# Patient Record
Sex: MALE | Race: WHITE | NOT HISPANIC OR LATINO | Employment: OTHER | URBAN - METROPOLITAN AREA
[De-identification: names, ages, dates, MRNs, and addresses within clinical notes are randomized per-mention and may not be internally consistent; named-entity substitution may affect disease eponyms.]

---

## 2020-06-10 ENCOUNTER — HOSPITAL ENCOUNTER (EMERGENCY)
Facility: HOSPITAL | Age: 70
Discharge: HOME | End: 2020-06-10
Attending: EMERGENCY MEDICINE
Payer: MEDICAID

## 2020-06-10 VITALS
OXYGEN SATURATION: 99 % | TEMPERATURE: 98 F | BODY MASS INDEX: 33.32 KG/M2 | DIASTOLIC BLOOD PRESSURE: 87 MMHG | RESPIRATION RATE: 18 BRPM | SYSTOLIC BLOOD PRESSURE: 141 MMHG | HEIGHT: 65 IN | WEIGHT: 200 LBS | HEART RATE: 94 BPM

## 2020-06-10 DIAGNOSIS — N30.01 ACUTE CYSTITIS WITH HEMATURIA: Primary | ICD-10-CM

## 2020-06-10 LAB
BACTERIA #/AREA URNS HPF: ABNORMAL /HPF
BILIRUB UR QL STRIP: NEGATIVE
CLARITY UR: ABNORMAL
COLOR UR: YELLOW
GLUCOSE UR QL STRIP: ABNORMAL
HGB UR QL STRIP: ABNORMAL
HYALINE CASTS #/AREA URNS LPF: 0 /LPF
KETONES UR QL STRIP: NEGATIVE
LEUKOCYTE ESTERASE UR QL STRIP: ABNORMAL
MICROSCOPIC COMMENT: ABNORMAL
NITRITE UR QL STRIP: POSITIVE
PH UR STRIP: 6 [PH] (ref 5–8)
PROT UR QL STRIP: ABNORMAL
RBC #/AREA URNS HPF: >100 /HPF (ref 0–4)
SP GR UR STRIP: 1.01 (ref 1–1.03)
URN SPEC COLLECT METH UR: ABNORMAL
UROBILINOGEN UR STRIP-ACNC: 1 EU/DL
WBC #/AREA URNS HPF: >100 /HPF (ref 0–5)
YEAST URNS QL MICRO: ABNORMAL

## 2020-06-10 PROCEDURE — 81000 URINALYSIS NONAUTO W/SCOPE: CPT

## 2020-06-10 PROCEDURE — 87186 SC STD MICRODIL/AGAR DIL: CPT

## 2020-06-10 PROCEDURE — 99283 EMERGENCY DEPT VISIT LOW MDM: CPT

## 2020-06-10 PROCEDURE — 87088 URINE BACTERIA CULTURE: CPT

## 2020-06-10 PROCEDURE — 87077 CULTURE AEROBIC IDENTIFY: CPT

## 2020-06-10 PROCEDURE — 25000003 PHARM REV CODE 250: Performed by: EMERGENCY MEDICINE

## 2020-06-10 PROCEDURE — 87086 URINE CULTURE/COLONY COUNT: CPT

## 2020-06-10 RX ORDER — NITROFURANTOIN 25; 75 MG/1; MG/1
100 CAPSULE ORAL 2 TIMES DAILY
Qty: 9 CAPSULE | Refills: 0 | Status: SHIPPED | OUTPATIENT
Start: 2020-06-10 | End: 2020-06-15

## 2020-06-10 RX ORDER — NITROFURANTOIN 25; 75 MG/1; MG/1
100 CAPSULE ORAL
Status: COMPLETED | OUTPATIENT
Start: 2020-06-10 | End: 2020-06-10

## 2020-06-10 RX ADMIN — NITROFURANTOIN MONOHYDRATE/MACROCRYSTALLINE 100 MG: 25; 75 CAPSULE ORAL at 02:06

## 2020-06-10 NOTE — DISCHARGE INSTRUCTIONS
As we discussed, return to the emergency department for new or worsening symptoms including fever, vomiting, or persistent abdominal pain.

## 2020-06-10 NOTE — ED NOTES
Pt presents to the ED with complaints of burning with urination as well as incontinence which began x 3 days ago. Denies abdominal pain. Bed rails are up and call light is within patient reach. Will continue to monitor.
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
## 2020-06-12 LAB — BACTERIA UR CULT: ABNORMAL

## 2020-06-13 ENCOUNTER — TELEPHONE (OUTPATIENT)
Dept: EMERGENCY MEDICINE | Facility: HOSPITAL | Age: 70
End: 2020-06-13

## 2020-06-14 NOTE — ED NOTES
Patient called back and called in new Rx for BactrimDS, disp: 14, si po bid.  Is to stop Macrobid. Patient spoke with Dr VERONICA Moreno

## 2020-06-14 NOTE — TELEPHONE ENCOUNTER
Called and spoke to patient regarding his urine culture growing out Staph.  Patient currently on Macrobid but switched to Bactrim which was called into [facility] on [address].  Patient understands and will  antibiotics to treat his urinary tract infection.

## 2021-11-24 ENCOUNTER — SPECIALTY PHARMACY (OUTPATIENT)
Dept: PHARMACY | Facility: CLINIC | Age: 71
End: 2021-11-24
Payer: MEDICAID

## 2021-12-01 ENCOUNTER — TELEPHONE (OUTPATIENT)
Dept: PHARMACY | Facility: CLINIC | Age: 71
End: 2021-12-01
Payer: MEDICAID

## 2022-01-28 ENCOUNTER — SPECIALTY PHARMACY (OUTPATIENT)
Dept: PHARMACY | Facility: CLINIC | Age: 72
End: 2022-01-28
Payer: MEDICAID
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
## 2022-03-12 ENCOUNTER — SPECIALTY PHARMACY (OUTPATIENT)
Dept: PHARMACY | Facility: CLINIC | Age: 72
End: 2022-03-12
Payer: MEDICAID

## 2022-03-12 NOTE — TELEPHONE ENCOUNTER
Specialty Pharmacy - Refill Coordination    Specialty Medication Orders Linked to Encounter    Flowsheet Row Most Recent Value   Medication #1 apremilast (OTEZLA) 30 mg Tab (Order#[id number], Rx#[id number])          Refill Questions - Documented Responses    Flowsheet Row Most Recent Value   Patient Availability and HIPAA Verification    Does patient want to proceed with activity? Yes   HIPAA/medical authority confirmed? Yes   Relationship to patient of person spoken to? Self   Refill Screening Questions    Changes to allergies? No   Changes to medications? No   New conditions since last clinic visit? No   Unplanned office visit, urgent care, ED, or hospital admission in the last 4 weeks? No   How does patient/caregiver feel medication is working? Excellent   Financial problems or insurance changes? No   How many doses of your specialty medications were missed in the last 4 weeks? 0   Would patient like to speak to a pharmacist? No   When does the patient need to receive the medication? 03/18/22   Refill Delivery Questions    How will the patient receive the medication? Delivery Lo   When does the patient need to receive the medication? 03/18/22   Shipping Address Home   Address in [facility] confirmed and updated if neccessary? Yes   Expected Copay ($) 9.85   Is the patient able to afford the medication copay? Yes   Payment Method CC on file   Days supply of Refill 30   Supplies needed? No supplies needed   Refill activity completed? Yes   Refill activity plan Refill scheduled   Shipment/Pickup Date: 03/16/22          Current Outpatient Medications   Medication Sig    ALCOHOL PADS PadM TEST 2 TIMES DAILY    apremilast (OTEZLA) 30 mg Tab Take 1 tablet by mouth twice a day    atorvastatin (LIPITOR) 10 MG tablet     augmented betamethasone dipropionate (DIPROLENE-AF) 0.05 % cream APPLY TO THE AFFECTED AREA TWICE FOR 2-3 WEEKS THEN TAKE 1 WEEK OFF    BYDUREON BCISE 2 mg/0.85 mL AtIn     citalopram  "(CELEXA) 20 MG tablet     COMFORT EZ INSULIN SYRINGE 1 mL 31 gauge x 5/16 Syrg INJECT SUBCUTANEOUSLY 2 TIMES DAILY    COMFORT EZ PEN NEEDLES 33 gauge x 5/32" Ndle INJECT SUBCUTANEOUSLY 2 TIMES DAILY    fluocinonide (LIDEX) 0.05 % external solution APPLY TO SCALP 1 2 TIMES A DAY    icosapent ethyL (VASCEPA) 1 gram Cap Take 2 capsules by mouth 2 (two) times daily.    JARDIANCE 25 mg tablet Take 25 mg by mouth once daily.    LANTUS SOLOSTAR U-100 INSULIN glargine 100 units/mL (3mL) SubQ pen INJECT 35 UNITS BELOW SKIN NIGHTLY AT BEDTIME    levothyroxine (SYNTHROID) 88 MCG tablet     losartan (COZAAR) 50 MG tablet Take 50 mg by mouth once daily.    multivitamin capsule Take 1 capsule by mouth once daily.    vit C-vit E-lutein-min-om-3 970-39-3-150 mg-unit-mg-mg Cap Take by mouth once daily.   Last reviewed on 1/28/2022  7:34 AM by Gabriele Edmondson, PharmD    Review of patient's allergies indicates:   Allergen Reactions    Vicodin [hydrocodone-acetaminophen] Nausea Only and Other (See Comments)     Turned pale, Lightheaded    Last reviewed on  1/28/2022 7:34 AM by Gabriele Edmondson      Tasks added this encounter   4/10/2022 - Refill Call (Auto Added)   Tasks due within next 3 months   No tasks due.     Irving Caceres, PharmD  [facility]  [address] [place] [id number]  Phone: [phone]  Fax: [phone]      "

## 2022-04-13 ENCOUNTER — SPECIALTY PHARMACY (OUTPATIENT)
Dept: PHARMACY | Facility: CLINIC | Age: 72
End: 2022-04-13
Payer: MEDICAID

## 2022-04-13 NOTE — TELEPHONE ENCOUNTER
Specialty Pharmacy - Refill Coordination    Specialty Medication Orders Linked to Encounter    Flowsheet Row Most Recent Value   Medication #1 apremilast (OTEZLA) 30 mg Tab (Order#[id number], Rx#[id number])          Refill Questions - Documented Responses    Flowsheet Row Most Recent Value   Patient Availability and HIPAA Verification    Does patient want to proceed with activity? Yes   HIPAA/medical authority confirmed? Yes   Relationship to patient of person spoken to? Self   Refill Screening Questions    Changes to allergies? No   Changes to medications? No   New conditions since last clinic visit? No   Unplanned office visit, urgent care, ED, or hospital admission in the last 4 weeks? No   How does patient/caregiver feel medication is working? Excellent   Financial problems or insurance changes? No   How many doses of your specialty medications were missed in the last 4 weeks? 0   Would patient like to speak to a pharmacist? No   When does the patient need to receive the medication? 04/15/22   Refill Delivery Questions    How will the patient receive the medication? Delivery Lo   When does the patient need to receive the medication? 04/15/22   Shipping Address Home   Address in [facility] confirmed and updated if neccessary? Yes   Expected Copay ($) 0   Is the patient able to afford the medication copay? Yes   Payment Method zero copay   Days supply of Refill 30   Supplies needed? No supplies needed   Refill activity completed? Yes   Refill activity plan Refill scheduled   Shipment/Pickup Date: 04/14/22          Current Outpatient Medications   Medication Sig    ALCOHOL PADS PadM TEST 2 TIMES DAILY    apremilast (OTEZLA) 30 mg Tab Take 1 tablet by mouth twice a day    atorvastatin (LIPITOR) 10 MG tablet     augmented betamethasone dipropionate (DIPROLENE-AF) 0.05 % cream APPLY TO THE AFFECTED AREA TWICE FOR 2-3 WEEKS THEN TAKE 1 WEEK OFF    BYDUREON BCISE 2 mg/0.85 mL AtIn     citalopram (CELEXA)  "20 MG tablet     COMFORT EZ INSULIN SYRINGE 1 mL 31 gauge x 5/16 Syrg INJECT SUBCUTANEOUSLY 2 TIMES DAILY    COMFORT EZ PEN NEEDLES 33 gauge x 5/32" Ndle INJECT SUBCUTANEOUSLY 2 TIMES DAILY    fluocinonide (LIDEX) 0.05 % external solution APPLY TO SCALP 1 2 TIMES A DAY    icosapent ethyL (VASCEPA) 1 gram Cap Take 2 capsules by mouth 2 (two) times daily.    JARDIANCE 25 mg tablet Take 25 mg by mouth once daily.    LANTUS SOLOSTAR U-100 INSULIN glargine 100 units/mL (3mL) SubQ pen INJECT 35 UNITS BELOW SKIN NIGHTLY AT BEDTIME    levothyroxine (SYNTHROID) 88 MCG tablet     losartan (COZAAR) 50 MG tablet Take 50 mg by mouth once daily.    multivitamin capsule Take 1 capsule by mouth once daily.    vit C-vit E-lutein-min-om-3 864-80-4-150 mg-unit-mg-mg Cap Take by mouth once daily.   Last reviewed on 1/28/2022  7:34 AM by Gabriele Edmondson PharmD    Review of patient's allergies indicates:   Allergen Reactions    Vicodin [hydrocodone-acetaminophen] Nausea Only and Other (See Comments)     Turned pale, Lightheaded    Last reviewed on  1/28/2022 7:34 AM by Gabriele Edmondson      Tasks added this encounter   No tasks added.   Tasks due within next 3 months   4/10/2022 - Refill Call (Auto Added)     [facility]  [address] [place] [id number]  Phone: [phone]  Fax: [phone]      "

## 2022-05-11 ENCOUNTER — SPECIALTY PHARMACY (OUTPATIENT)
Dept: PHARMACY | Facility: CLINIC | Age: 72
End: 2022-05-11
Payer: MEDICAID

## 2022-05-11 NOTE — TELEPHONE ENCOUNTER
Specialty Pharmacy - Refill Coordination    Specialty Medication Orders Linked to Encounter    Flowsheet Row Most Recent Value   Medication #1 apremilast (OTEZLA) 30 mg Tab (Order#[id number], Rx#[id number])          Refill Questions - Documented Responses    Flowsheet Row Most Recent Value   Patient Availability and HIPAA Verification    Does patient want to proceed with activity? Yes   HIPAA/medical authority confirmed? Yes   Relationship to patient of person spoken to? Self   Refill Screening Questions    Changes to allergies? No   Changes to medications? No   New conditions since last clinic visit? No   Unplanned office visit, urgent care, ED, or hospital admission in the last 4 weeks? No   How does patient/caregiver feel medication is working? Good   Financial problems or insurance changes? No   How many doses of your specialty medications were missed in the last 4 weeks? 0   Would patient like to speak to a pharmacist? No   When does the patient need to receive the medication? 05/14/22   Refill Delivery Questions    How will the patient receive the medication? Delivery Lo   When does the patient need to receive the medication? 05/14/22   Shipping Address Home   Address in [facility] confirmed and updated if neccessary? Yes   Expected Copay ($) 0   Is the patient able to afford the medication copay? Yes   Payment Method zero copay   Days supply of Refill 30   Supplies needed? No supplies needed   Refill activity completed? Yes   Refill activity plan Refill scheduled   Shipment/Pickup Date: 05/13/22          Current Outpatient Medications   Medication Sig    ALCOHOL PADS PadM TEST 2 TIMES DAILY    apremilast (OTEZLA) 30 mg Tab Take 1 tablet by mouth twice a day    atorvastatin (LIPITOR) 10 MG tablet     augmented betamethasone dipropionate (DIPROLENE-AF) 0.05 % cream APPLY TO THE AFFECTED AREA TWICE FOR 2-3 WEEKS THEN TAKE 1 WEEK OFF    BYDUREON BCISE 2 mg/0.85 mL AtIn     citalopram (CELEXA) 20 MG  "tablet     COMFORT EZ INSULIN SYRINGE 1 mL 31 gauge x 5/16 Syrg INJECT SUBCUTANEOUSLY 2 TIMES DAILY    COMFORT EZ PEN NEEDLES 33 gauge x 5/32" Ndle INJECT SUBCUTANEOUSLY 2 TIMES DAILY    fluocinonide (LIDEX) 0.05 % external solution APPLY TO SCALP 1 2 TIMES A DAY    icosapent ethyL (VASCEPA) 1 gram Cap Take 2 capsules by mouth 2 (two) times daily.    JARDIANCE 25 mg tablet Take 25 mg by mouth once daily.    LANTUS SOLOSTAR U-100 INSULIN glargine 100 units/mL (3mL) SubQ pen INJECT 35 UNITS BELOW SKIN NIGHTLY AT BEDTIME    levothyroxine (SYNTHROID) 88 MCG tablet     losartan (COZAAR) 50 MG tablet Take 50 mg by mouth once daily.    multivitamin capsule Take 1 capsule by mouth once daily.    vit C-vit E-lutein-min-om-3 [id number] mg-unit-mg-mg Cap Take by mouth once daily.   Last reviewed on 1/28/2022  7:34 AM by Gabriele Edmondson, PharmD    Review of patient's allergies indicates:   Allergen Reactions    Vicodin [hydrocodone-acetaminophen] Nausea Only and Other (See Comments)     Turned pale, Lightheaded    Last reviewed on  1/28/2022 7:34 AM by Gabriele Edmondson      Tasks added this encounter   6/6/2022 - Refill Call (Auto Added)   Tasks due within next 3 months   No tasks due.     Vianey Holder [facility] - Specialty Pharmacy  [address] [facility] [address]  Phone: [phone]  Fax: [phone]      "

## 2022-06-13 ENCOUNTER — SPECIALTY PHARMACY (OUTPATIENT)
Dept: PHARMACY | Facility: CLINIC | Age: 72
End: 2022-06-13
Payer: MEDICAID

## 2022-06-13 DIAGNOSIS — L40.9 PSORIASIS: Primary | ICD-10-CM

## 2022-06-13 NOTE — TELEPHONE ENCOUNTER
"Specialty Pharmacy - Refill Coordination    Specialty Medication Orders Linked to Encounter    Flowsheet Row Most Recent Value   Medication #1 apremilast (OTEZLA) 30 mg Tab (Order#[id number], Rx#[id number])          Refill Questions - Documented Responses    Flowsheet Row Most Recent Value   Patient Availability and HIPAA Verification    Does patient want to proceed with activity? Yes   HIPAA/medical authority confirmed? Yes   Relationship to patient of person spoken to? Self   Refill Screening Questions    Changes to allergies? No   Changes to medications? No   New conditions since last clinic visit? No   Unplanned office visit, urgent care, ED, or hospital admission in the last 4 weeks? No   How does patient/caregiver feel medication is working? Good   Financial problems or insurance changes? No   How many doses of your specialty medications were missed in the last 4 weeks? 0   Would patient like to speak to a pharmacist? No   When does the patient need to receive the medication? 06/15/22   Refill Delivery Questions    How will the patient receive the medication? Delivery Lo   When does the patient need to receive the medication? 06/15/22   Shipping Address Home   Address in [facility] confirmed and updated if neccessary? Yes   Expected Copay ($) 0   Is the patient able to afford the medication copay? Yes   Payment Method zero copay   Days supply of Refill 30   Supplies needed? No supplies needed   Refill activity completed? Yes   Refill activity plan Refill scheduled   Shipment/Pickup Date: 06/15/22          Current Outpatient Medications   Medication Sig    ALCOHOL PADS PadM TEST 2 TIMES DAILY    apremilast (OTEZLA) 30 mg Tab Take 1 tablet by mouth twice a day    atorvastatin (LIPITOR) 10 MG tablet     augmented betamethasone dipropionate (DIPROLENE-AF) 0.05 % cream APPLY TO THE AFFECTED AREA TWICE FOR 2-3 WEEKS THEN TAKE 1 WEEK OFF    BD ULTRA-FINE SHANI PEN NEEDLE 32 gauge x 5/32" Ndle Inject 1 " "each into the skin 3 (three) times daily.    BYDUREON BCISE 2 mg/0.85 mL AtIn     citalopram (CELEXA) 20 MG tablet Take 20 mg by mouth once daily.    COMFORT EZ INSULIN SYRINGE 1 mL 31 gauge x 5/16 Syrg INJECT SUBCUTANEOUSLY 2 TIMES DAILY    COMFORT EZ PEN NEEDLES 33 gauge x 5/32" Ndle INJECT SUBCUTANEOUSLY 2 TIMES DAILY    fluocinonide (LIDEX) 0.05 % external solution APPLY TO SCALP 1 2 TIMES A DAY    icosapent ethyL (VASCEPA) 1 gram Cap Take 2 capsules by mouth 2 (two) times daily.    JARDIANCE 25 mg tablet Take 25 mg by mouth once daily.    LANTUS SOLOSTAR U-100 INSULIN glargine 100 units/mL (3mL) SubQ pen INJECT 35 UNITS BELOW SKIN NIGHTLY AT BEDTIME    levothyroxine (SYNTHROID) 88 MCG tablet Take 88 mcg by mouth once daily.    losartan (COZAAR) 50 MG tablet Take 50 mg by mouth once daily.    multivitamin capsule Take 1 capsule by mouth once daily.    vit C-vit E-lutein-min-om-3 [id number] mg-unit-mg-mg Cap Take by mouth once daily.   Last reviewed on 5/18/2022 10:17 AM by Katie Carrillo LPN    Review of patient's allergies indicates:   Allergen Reactions    Vicodin [hydrocodone-acetaminophen] Nausea Only and Other (See Comments)     Turned pale, Lightheaded    Last reviewed on  1/28/2022 7:34 AM by Gabriele Edmondson      Tasks added this encounter   7/8/2022 - Refill Call (Auto Added)   Tasks due within next 3 months   No tasks due.     Jessica Renteria, PharmD  [facility] - Specialty Pharmacy  [address] [place] [address]  Phone: [phone]  Fax: [phone]      "

## 2022-07-12 ENCOUNTER — SPECIALTY PHARMACY (OUTPATIENT)
Dept: PHARMACY | Facility: CLINIC | Age: 72
End: 2022-07-12
Payer: MEDICAID

## 2022-07-12 NOTE — TELEPHONE ENCOUNTER
"Specialty Pharmacy - Refill Coordination    Specialty Medication Orders Linked to Encounter    Flowsheet Row Most Recent Value   Medication #1 apremilast (OTEZLA) 30 mg Tab (Order#[id number], Rx#[id number])          Refill Questions - Documented Responses    Flowsheet Row Most Recent Value   Patient Availability and HIPAA Verification    Does patient want to proceed with activity? Yes   HIPAA/medical authority confirmed? Yes   Relationship to patient of person spoken to? Self   Refill Screening Questions    Changes to allergies? No   Changes to medications? No   New conditions since last clinic visit? No   Unplanned office visit, urgent care, ED, or hospital admission in the last 4 weeks? No   How does patient/caregiver feel medication is working? Good   Financial problems or insurance changes? No   How many doses of your specialty medications were missed in the last 4 weeks? 0   Would patient like to speak to a pharmacist? No   When does the patient need to receive the medication? 07/14/22   Refill Delivery Questions    How will the patient receive the medication? Delivery Lo   When does the patient need to receive the medication? 07/14/22   Shipping Address Home   Address in [facility] confirmed and updated if neccessary? Yes   Expected Copay ($) 0   Is the patient able to afford the medication copay? Yes   Payment Method zero copay   Days supply of Refill 28   Supplies needed? No supplies needed   Refill activity completed? Yes   Refill activity plan Refill scheduled   Shipment/Pickup Date: 07/12/22          Current Outpatient Medications   Medication Sig    ALCOHOL PADS PadM TEST 2 TIMES DAILY    apremilast (OTEZLA) 30 mg Tab Take 1 tablet by mouth twice a day    atorvastatin (LIPITOR) 10 MG tablet     augmented betamethasone dipropionate (DIPROLENE-AF) 0.05 % cream APPLY TO THE AFFECTED AREA TWICE FOR 2-3 WEEKS THEN TAKE 1 WEEK OFF    BD ULTRA-FINE SHANI PEN NEEDLE 32 gauge x 5/32" Ndle Inject 1 " "each into the skin 3 (three) times daily.    BYDUREON BCISE 2 mg/0.85 mL AtIn     citalopram (CELEXA) 20 MG tablet Take 20 mg by mouth once daily.    COMFORT EZ INSULIN SYRINGE 1 mL 31 gauge x 5/16 Syrg INJECT SUBCUTANEOUSLY 2 TIMES DAILY    COMFORT EZ PEN NEEDLES 33 gauge x 5/32" Ndle INJECT SUBCUTANEOUSLY 2 TIMES DAILY    fluocinonide (LIDEX) 0.05 % external solution APPLY TO SCALP 1 2 TIMES A DAY    icosapent ethyL (VASCEPA) 1 gram Cap Take 2 capsules by mouth 2 (two) times daily.    JARDIANCE 25 mg tablet Take 25 mg by mouth once daily.    LANTUS SOLOSTAR U-100 INSULIN glargine 100 units/mL (3mL) SubQ pen INJECT 35 UNITS BELOW SKIN NIGHTLY AT BEDTIME    levothyroxine (SYNTHROID) 88 MCG tablet Take 88 mcg by mouth once daily.    losartan (COZAAR) 50 MG tablet Take 50 mg by mouth once daily.    multivitamin capsule Take 1 capsule by mouth once daily.    vit C-vit E-lutein-min-om-3 417-66-2-150 mg-unit-mg-mg Cap Take by mouth once daily.   Last reviewed on 5/18/2022 10:17 AM by Katie Carrillo LPN    Review of patient's allergies indicates:   Allergen Reactions    Vicodin [hydrocodone-acetaminophen] Nausea Only and Other (See Comments)     Turned pale, Lightheaded    Last reviewed on  1/28/2022 7:34 AM by Gabriele Edmondson      Tasks added this encounter   8/4/2022 - Refill Call (Auto Added)   Tasks due within next 3 months   No tasks due.     Dennis Contreras  [facility] - Specialty Pharmacy  [address] [place] [address]  Phone: [phone]  Fax: [phone]      "

## 2022-08-04 ENCOUNTER — SPECIALTY PHARMACY (OUTPATIENT)
Dept: PHARMACY | Facility: CLINIC | Age: 72
End: 2022-08-04
Payer: MEDICAID

## 2022-08-04 NOTE — TELEPHONE ENCOUNTER
"Specialty Pharmacy - Refill Coordination    Specialty Medication Orders Linked to Encounter    Flowsheet Row Most Recent Value   Medication #1 apremilast (OTEZLA) 30 mg Tab (Order#[id number], Rx#[id number])          Refill Questions - Documented Responses    Flowsheet Row Most Recent Value   Patient Availability and HIPAA Verification    Does patient want to proceed with activity? Yes   HIPAA/medical authority confirmed? Yes   Relationship to patient of person spoken to? Self   Refill Screening Questions    Changes to allergies? No   Changes to medications? No   New conditions since last clinic visit? No   Unplanned office visit, urgent care, ED, or hospital admission in the last 4 weeks? No   How does patient/caregiver feel medication is working? Good   Financial problems or insurance changes? No   How many doses of your specialty medications were missed in the last 4 weeks? 0   Would patient like to speak to a pharmacist? No   When does the patient need to receive the medication? 08/13/22   Refill Delivery Questions    When does the patient need to receive the medication? 08/13/22   Shipping Address Home   Address in [facility] confirmed and updated if neccessary? Yes   Expected Copay ($) 0   Is the patient able to afford the medication copay? Yes   Payment Method zero copay   Days supply of Refill 30   Supplies needed? No supplies needed   Refill activity completed? Yes   Refill activity plan Refill scheduled   Shipment/Pickup Date: 08/09/22          Current Outpatient Medications   Medication Sig    ALCOHOL PADS PadM TEST 2 TIMES DAILY    apremilast (OTEZLA) 30 mg Tab Take 1 tablet by mouth twice a day    atorvastatin (LIPITOR) 10 MG tablet     augmented betamethasone dipropionate (DIPROLENE-AF) 0.05 % cream APPLY TO THE AFFECTED AREA TWICE FOR 2-3 WEEKS THEN TAKE 1 WEEK OFF    BD ULTRA-FINE SHANI PEN NEEDLE 32 gauge x 5/32" Ndle Inject 1 each into the skin 3 (three) times daily.    BYDUREON BCISE 2 " "mg/0.85 mL AtIn     citalopram (CELEXA) 20 MG tablet Take 20 mg by mouth once daily.    COMFORT EZ INSULIN SYRINGE 1 mL 31 gauge x 5/16 Syrg INJECT SUBCUTANEOUSLY 2 TIMES DAILY    COMFORT EZ PEN NEEDLES 33 gauge x 5/32" Ndle INJECT SUBCUTANEOUSLY 2 TIMES DAILY    fluocinonide (LIDEX) 0.05 % external solution APPLY TO SCALP 1 2 TIMES A DAY    icosapent ethyL (VASCEPA) 1 gram Cap Take 2 capsules by mouth 2 (two) times daily.    JARDIANCE 25 mg tablet Take 25 mg by mouth once daily.    LANTUS SOLOSTAR U-100 INSULIN glargine 100 units/mL (3mL) SubQ pen INJECT 35 UNITS BELOW SKIN NIGHTLY AT BEDTIME    levothyroxine (SYNTHROID) 88 MCG tablet Take 88 mcg by mouth once daily.    losartan (COZAAR) 50 MG tablet Take 50 mg by mouth once daily.    multivitamin capsule Take 1 capsule by mouth once daily.    vit C-vit E-lutein-min-om-3 346-10-4-150 mg-unit-mg-mg Cap Take by mouth once daily.   Last reviewed on 5/18/2022 10:17 AM by Katie Carrillo LPN    Review of patient's allergies indicates:   Allergen Reactions    Vicodin [hydrocodone-acetaminophen] Nausea Only and Other (See Comments)     Turned pale, Lightheaded    Last reviewed on  1/28/2022 7:34 AM by Gabriele Edmondson      Tasks added this encounter   No tasks added.   Tasks due within next 3 months   8/4/2022 - Refill Call (Auto Added)     Дмитрий Camacho, PharmD  [facility] - Specialty Pharm[facility]  [address] [place] [id number]  Phone: [phone]  Fax: [phone]      "

## 2022-09-06 ENCOUNTER — SPECIALTY PHARMACY (OUTPATIENT)
Dept: PHARMACY | Facility: CLINIC | Age: 72
End: 2022-09-06
Payer: MEDICAID

## 2022-09-06 NOTE — TELEPHONE ENCOUNTER
"Specialty Pharmacy - Refill Coordination    Specialty Medication Orders Linked to Encounter      Flowsheet Row Most Recent Value   Medication #1 apremilast (OTEZLA) 30 mg Tab (Order#[id number], Rx#[id number])          Refill Questions - Documented Responses      Flowsheet Row Most Recent Value   Patient Availability and HIPAA Verification    Does patient want to proceed with activity? Yes   HIPAA/medical authority confirmed? Yes   Relationship to patient of person spoken to? Self   Refill Screening Questions    Changes to allergies? No   Changes to medications? No   New conditions since last clinic visit? No   Unplanned office visit, urgent care, ED, or hospital admission in the last 4 weeks? No   How does patient/caregiver feel medication is working? Good   Financial problems or insurance changes? No   How many doses of your specialty medications were missed in the last 4 weeks? 0   Would patient like to speak to a pharmacist? No   When does the patient need to receive the medication? 09/13/22   Refill Delivery Questions    How will the patient receive the medication? Delivery Lo   When does the patient need to receive the medication? 09/13/22   Shipping Address Home   Address in [facility] confirmed and updated if neccessary? Yes   Expected Copay ($) 0   Is the patient able to afford the medication copay? Yes   Payment Method zero copay   Days supply of Refill 30   Supplies needed? No supplies needed   Refill activity completed? Yes   Refill activity plan Refill scheduled   Shipment/Pickup Date: 09/08/22            Current Outpatient Medications   Medication Sig    ALCOHOL PADS PadM TEST 2 TIMES DAILY    apremilast (OTEZLA) 30 mg Tab Take 1 tablet by mouth twice a day    atorvastatin (LIPITOR) 10 MG tablet     augmented betamethasone dipropionate (DIPROLENE-AF) 0.05 % cream APPLY TO THE AFFECTED AREA TWICE FOR 2-3 WEEKS THEN TAKE 1 WEEK OFF    BD ULTRA-FINE SHANI PEN NEEDLE 32 gauge x 5/32" Ndle Inject 1 " "each into the skin 3 (three) times daily.    BYDUREON BCISE 2 mg/0.85 mL AtIn     citalopram (CELEXA) 20 MG tablet Take 20 mg by mouth once daily.    COMFORT EZ INSULIN SYRINGE 1 mL 31 gauge x 5/16 Syrg INJECT SUBCUTANEOUSLY 2 TIMES DAILY    COMFORT EZ PEN NEEDLES 33 gauge x 5/32" Ndle INJECT SUBCUTANEOUSLY 2 TIMES DAILY    fluocinonide (LIDEX) 0.05 % external solution APPLY TO SCALP 1 2 TIMES A DAY    icosapent ethyL (VASCEPA) 1 gram Cap Take 2 capsules by mouth 2 (two) times daily.    JARDIANCE 25 mg tablet Take 25 mg by mouth once daily.    LANTUS SOLOSTAR U-100 INSULIN glargine 100 units/mL (3mL) SubQ pen INJECT 35 UNITS BELOW SKIN NIGHTLY AT BEDTIME    levothyroxine (SYNTHROID) 88 MCG tablet Take 88 mcg by mouth once daily.    losartan (COZAAR) 50 MG tablet Take 50 mg by mouth once daily.    multivitamin capsule Take 1 capsule by mouth once daily.    vit C-vit E-lutein-min-om-3 759-22-2-150 mg-unit-mg-mg Cap Take by mouth once daily.   Last reviewed on 5/18/2022 10:17 AM by Katie Carrillo LPN    Review of patient's allergies indicates:   Allergen Reactions    Vicodin [hydrocodone-acetaminophen] Nausea Only and Other (See Comments)     Turned pale, Lightheaded    Last reviewed on  1/28/2022 7:34 AM by Gabriele Edmondson      Tasks added this encounter   10/6/2022 - Refill Call (Auto Added)   Tasks due within next 3 months   No tasks due.     Fortunato Calhoun, PharmD  [facility] - Specialty Pharmacy  [address] [facility] [address]  Phone: [phone]  Fax: [phone]        "

## 2022-10-03 ENCOUNTER — HOSPITAL ENCOUNTER (EMERGENCY)
Facility: HOSPITAL | Age: 72
Discharge: HOME | End: 2022-10-03
Attending: EMERGENCY MEDICINE
Payer: MEDICAID

## 2022-10-03 VITALS
HEART RATE: 62 BPM | HEIGHT: 65 IN | BODY MASS INDEX: 33.32 KG/M2 | WEIGHT: 200 LBS | DIASTOLIC BLOOD PRESSURE: 65 MMHG | TEMPERATURE: 98 F | SYSTOLIC BLOOD PRESSURE: 136 MMHG | RESPIRATION RATE: 20 BRPM | OXYGEN SATURATION: 95 %

## 2022-10-03 DIAGNOSIS — S39.012A STRAIN OF LUMBAR REGION, INITIAL ENCOUNTER: Primary | ICD-10-CM

## 2022-10-03 LAB
ALBUMIN SERPL BCP-MCNC: 4.2 G/DL (ref 3.5–5.2)
ALP SERPL-CCNC: 244 U/L (ref 55–135)
ALT SERPL W/O P-5'-P-CCNC: 35 U/L (ref 10–44)
ANION GAP SERPL CALC-SCNC: 10 MMOL/L (ref 8–16)
AST SERPL-CCNC: 39 U/L (ref 10–40)
BACTERIA #/AREA URNS HPF: NORMAL /HPF
BASOPHILS # BLD AUTO: 0.05 K/UL (ref 0–0.2)
BASOPHILS NFR BLD: 0.7 % (ref 0–1.9)
BILIRUB SERPL-MCNC: 0.9 MG/DL (ref 0.1–1)
BILIRUB UR QL STRIP: NEGATIVE
BUN SERPL-MCNC: 9 MG/DL (ref 8–23)
CALCIUM SERPL-MCNC: 10.2 MG/DL (ref 8.7–10.5)
CHLORIDE SERPL-SCNC: 104 MMOL/L (ref 95–110)
CLARITY UR: CLEAR
CO2 SERPL-SCNC: 27 MMOL/L (ref 23–29)
COLOR UR: YELLOW
CREAT SERPL-MCNC: 1.3 MG/DL (ref 0.5–1.4)
DIFFERENTIAL METHOD: ABNORMAL
EOSINOPHIL # BLD AUTO: 0.3 K/UL (ref 0–0.5)
EOSINOPHIL NFR BLD: 3.5 % (ref 0–8)
ERYTHROCYTE [DISTWIDTH] IN BLOOD BY AUTOMATED COUNT: 14.6 % (ref 11.5–14.5)
EST. GFR  (NO RACE VARIABLE): 59 ML/MIN/1.73 M^2
GLUCOSE SERPL-MCNC: 122 MG/DL (ref 70–110)
GLUCOSE UR QL STRIP: ABNORMAL
HCT VFR BLD AUTO: 43.2 % (ref 40–54)
HGB BLD-MCNC: 14.4 G/DL (ref 14–18)
HGB UR QL STRIP: NEGATIVE
IMM GRANULOCYTES # BLD AUTO: 0.02 K/UL (ref 0–0.04)
IMM GRANULOCYTES NFR BLD AUTO: 0.3 % (ref 0–0.5)
KETONES UR QL STRIP: NEGATIVE
LEUKOCYTE ESTERASE UR QL STRIP: NEGATIVE
LYMPHOCYTES # BLD AUTO: 1.9 K/UL (ref 1–4.8)
LYMPHOCYTES NFR BLD: 26.5 % (ref 18–48)
MCH RBC QN AUTO: 28 PG (ref 27–31)
MCHC RBC AUTO-ENTMCNC: 33.3 G/DL (ref 32–36)
MCV RBC AUTO: 84 FL (ref 82–98)
MICROSCOPIC COMMENT: NORMAL
MONOCYTES # BLD AUTO: 0.7 K/UL (ref 0.3–1)
MONOCYTES NFR BLD: 9.8 % (ref 4–15)
NEUTROPHILS # BLD AUTO: 4.2 K/UL (ref 1.8–7.7)
NEUTROPHILS NFR BLD: 59.2 % (ref 38–73)
NITRITE UR QL STRIP: NEGATIVE
NRBC BLD-RTO: 0 /100 WBC
PH UR STRIP: 6 [PH] (ref 5–8)
PLATELET # BLD AUTO: 254 K/UL (ref 150–450)
PMV BLD AUTO: 10.8 FL (ref 9.2–12.9)
POTASSIUM SERPL-SCNC: 3.4 MMOL/L (ref 3.5–5.1)
PROT SERPL-MCNC: 8.3 G/DL (ref 6–8.4)
PROT UR QL STRIP: NEGATIVE
RBC # BLD AUTO: 5.14 M/UL (ref 4.6–6.2)
SODIUM SERPL-SCNC: 141 MMOL/L (ref 136–145)
SP GR UR STRIP: <=1.005 (ref 1–1.03)
URN SPEC COLLECT METH UR: ABNORMAL
UROBILINOGEN UR STRIP-ACNC: NEGATIVE EU/DL
WBC # BLD AUTO: 7.16 K/UL (ref 3.9–12.7)
YEAST URNS QL MICRO: NORMAL

## 2022-10-03 PROCEDURE — 63600175 PHARM REV CODE 636 W HCPCS: Performed by: EMERGENCY MEDICINE

## 2022-10-03 PROCEDURE — 99284 EMERGENCY DEPT VISIT MOD MDM: CPT

## 2022-10-03 PROCEDURE — 81000 URINALYSIS NONAUTO W/SCOPE: CPT | Performed by: EMERGENCY MEDICINE

## 2022-10-03 PROCEDURE — 80053 COMPREHEN METABOLIC PANEL: CPT | Performed by: EMERGENCY MEDICINE

## 2022-10-03 PROCEDURE — 96372 THER/PROPH/DIAG INJ SC/IM: CPT | Performed by: EMERGENCY MEDICINE

## 2022-10-03 PROCEDURE — 36415 COLL VENOUS BLD VENIPUNCTURE: CPT | Performed by: EMERGENCY MEDICINE

## 2022-10-03 PROCEDURE — 85025 COMPLETE CBC W/AUTO DIFF WBC: CPT | Performed by: EMERGENCY MEDICINE

## 2022-10-03 PROCEDURE — 25000003 PHARM REV CODE 250: Performed by: EMERGENCY MEDICINE

## 2022-10-03 RX ORDER — KETOROLAC TROMETHAMINE 30 MG/ML
30 INJECTION, SOLUTION INTRAMUSCULAR; INTRAVENOUS
Status: COMPLETED | OUTPATIENT
Start: 2022-10-03 | End: 2022-10-03

## 2022-10-03 RX ORDER — CYCLOBENZAPRINE HCL 10 MG
10 TABLET ORAL 3 TIMES DAILY PRN
Qty: 15 TABLET | Refills: 0 | Status: SHIPPED | OUTPATIENT
Start: 2022-10-03 | End: 2022-10-08

## 2022-10-03 RX ORDER — CYCLOBENZAPRINE HCL 10 MG
10 TABLET ORAL
Status: COMPLETED | OUTPATIENT
Start: 2022-10-03 | End: 2022-10-03

## 2022-10-03 RX ORDER — OXYCODONE AND ACETAMINOPHEN 5; 325 MG/1; MG/1
1 TABLET ORAL EVERY 4 HOURS PRN
Qty: 12 TABLET | Refills: 0 | Status: ON HOLD | OUTPATIENT
Start: 2022-10-03 | End: 2023-09-14 | Stop reason: HOSPADM

## 2022-10-03 RX ADMIN — CYCLOBENZAPRINE 10 MG: 10 TABLET, FILM COATED ORAL at 08:10

## 2022-10-03 RX ADMIN — KETOROLAC TROMETHAMINE 30 MG: 30 INJECTION, SOLUTION INTRAMUSCULAR; INTRAVENOUS at 08:10
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
## 2022-10-06 ENCOUNTER — SPECIALTY PHARMACY (OUTPATIENT)
Dept: PHARMACY | Facility: CLINIC | Age: 72
End: 2022-10-06
Payer: MEDICAID

## 2022-10-06 NOTE — TELEPHONE ENCOUNTER
"Specialty Pharmacy - Refill Coordination    Specialty Medication Orders Linked to Encounter      Flowsheet Row Most Recent Value   Medication #1 apremilast (OTEZLA) 30 mg Tab (Order#[id number], Rx#[id number])            Refill Questions - Documented Responses      Flowsheet Row Most Recent Value   Patient Availability and HIPAA Verification    Does patient want to proceed with activity? Yes   HIPAA/medical authority confirmed? Yes   Relationship to patient of person spoken to? Self   Refill Screening Questions    Changes to allergies? No   Changes to medications? No   New conditions since last clinic visit? No   Unplanned office visit, urgent care, ED, or hospital admission in the last 4 weeks? No   How does patient/caregiver feel medication is working? Very good   Financial problems or insurance changes? No   How many doses of your specialty medications were missed in the last 4 weeks? 0   Would patient like to speak to a pharmacist? No   When does the patient need to receive the medication? 10/13/22   Refill Delivery Questions    How will the patient receive the medication? MEDRx   When does the patient need to receive the medication? 10/13/22   Shipping Address Home   Address in [facility] confirmed and updated if neccessary? Yes   Expected Copay ($) 0   Is the patient able to afford the medication copay? Yes   Payment Method zero copay   Days supply of Refill 30   Supplies needed? No supplies needed   Refill activity completed? Yes   Refill activity plan Refill scheduled   Shipment/Pickup Date: 10/10/22            Current Outpatient Medications   Medication Sig    ALCOHOL PADS PadM TEST 2 TIMES DAILY    apremilast (OTEZLA) 30 mg Tab Take 1 tablet by mouth twice a day    atorvastatin (LIPITOR) 10 MG tablet     augmented betamethasone dipropionate (DIPROLENE-AF) 0.05 % cream APPLY TO THE AFFECTED AREA TWICE FOR 2-3 WEEKS THEN TAKE 1 WEEK OFF    BD ULTRA-FINE SHANI PEN NEEDLE 32 gauge x 5/32" Ndle Inject 1 " "each into the skin 3 (three) times daily.    BYDUREON BCISE 2 mg/0.85 mL AtIn     citalopram (CELEXA) 20 MG tablet Take 20 mg by mouth once daily.    COMFORT EZ INSULIN SYRINGE 1 mL 31 gauge x 5/16 Syrg INJECT SUBCUTANEOUSLY 2 TIMES DAILY    COMFORT EZ PEN NEEDLES 33 gauge x 5/32" Ndle INJECT SUBCUTANEOUSLY 2 TIMES DAILY    cyclobenzaprine (FLEXERIL) 10 MG tablet Take 1 tablet (10 mg total) by mouth 3 (three) times daily as needed for Muscle spasms.    fluocinonide (LIDEX) 0.05 % external solution APPLY TO SCALP 1 2 TIMES A DAY    icosapent ethyL (VASCEPA) 1 gram Cap Take 2 capsules by mouth 2 (two) times daily.    JARDIANCE 25 mg tablet Take 25 mg by mouth once daily.    LANTUS SOLOSTAR U-100 INSULIN glargine 100 units/mL (3mL) SubQ pen INJECT 35 UNITS BELOW SKIN NIGHTLY AT BEDTIME    levothyroxine (SYNTHROID) 88 MCG tablet Take 88 mcg by mouth once daily.    losartan (COZAAR) 50 MG tablet Take 50 mg by mouth once daily.    multivitamin capsule Take 1 capsule by mouth once daily.    oxyCODONE-acetaminophen (PERCOCET) 5-325 mg per tablet Take 1 tablet by mouth every 4 (four) hours as needed for Pain.    vit C-vit E-lutein-min-om-3 018-39-8-150 mg-unit-mg-mg Cap Take by mouth once daily.   Last reviewed on 10/3/2022  7:50 AM by Kaiden Richards III, MD    Review of patient's allergies indicates:   Allergen Reactions    Vicodin [hydrocodone-acetaminophen] Nausea Only and Other (See Comments)     Turned pale, Lightheaded    Last reviewed on  10/3/2022 7:50 AM by Kaiden Richards      Tasks added this encounter   No tasks added.   Tasks due within next 3 months   10/6/2022 - Refill Call (Auto Added)     Jose SuazoD  [facility]  [address] [place] [address]  Phone: [phone]  Fax: [phone]        "

## 2022-11-07 ENCOUNTER — SPECIALTY PHARMACY (OUTPATIENT)
Dept: PHARMACY | Facility: CLINIC | Age: 72
End: 2022-11-07
Payer: MEDICARE
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
## 2022-12-13 ENCOUNTER — SPECIALTY PHARMACY (OUTPATIENT)
Dept: PHARMACY | Facility: CLINIC | Age: 72
End: 2022-12-13
Payer: MEDICARE

## 2022-12-13 NOTE — TELEPHONE ENCOUNTER
Outgoing call: patient has 4 day supply on hand. I informed him that a refill request was sent on 12/8 and we still haven't heard back yet. He stated he will follow up on his end. [facility] will follow up once refills are approved.

## 2022-12-16 NOTE — TELEPHONE ENCOUNTER
"Specialty Pharmacy - Refill Coordination    Specialty Medication Orders Linked to Encounter      Flowsheet Row Most Recent Value   Medication #1 apremilast (OTEZLA) 30 mg Tab (Order#[id number], Rx#[id number])            Refill Questions - Documented Responses      Flowsheet Row Most Recent Value   Patient Availability and HIPAA Verification    Does patient want to proceed with activity? Yes   HIPAA/medical authority confirmed? Yes   Relationship to patient of person spoken to? Self   Refill Screening Questions    Changes to allergies? No   Changes to medications? No   New conditions since last clinic visit? No   Unplanned office visit, urgent care, ED, or hospital admission in the last 4 weeks? No   How does patient/caregiver feel medication is working? Excellent   Financial problems or insurance changes? No   How many doses of your specialty medications were missed in the last 4 weeks? 0   Would patient like to speak to a pharmacist? No   When does the patient need to receive the medication? 12/20/22   Refill Delivery Questions    How will the patient receive the medication? MEDRx   When does the patient need to receive the medication? 12/20/22   Shipping Address Home   Address in [facility] confirmed and updated if neccessary? Yes   Expected Copay ($) 0   Is the patient able to afford the medication copay? Yes   Payment Method zero copay   Days supply of Refill 30   Supplies needed? No supplies needed   Refill activity completed? Yes   Refill activity plan Refill scheduled   Shipment/Pickup Date: 12/19/22            Current Outpatient Medications   Medication Sig    ALCOHOL PADS PadM TEST 2 TIMES DAILY    apremilast (OTEZLA) 30 mg Tab Take 1 tablet by mouth twice a day    atorvastatin (LIPITOR) 10 MG tablet     augmented betamethasone dipropionate (DIPROLENE-AF) 0.05 % cream APPLY TO THE AFFECTED AREA TWICE FOR 2-3 WEEKS THEN TAKE 1 WEEK OFF    BD ULTRA-FINE SHANI PEN NEEDLE 32 gauge x 5/32" Ndle Inject 1 " "each into the skin 3 (three) times daily.    BYDUREON BCISE 2 mg/0.85 mL AtIn     citalopram (CELEXA) 20 MG tablet Take 20 mg by mouth once daily.    COMFORT EZ INSULIN SYRINGE 1 mL 31 gauge x 5/16 Syrg INJECT SUBCUTANEOUSLY 2 TIMES DAILY    COMFORT EZ PEN NEEDLES 33 gauge x 5/32" Ndle INJECT SUBCUTANEOUSLY 2 TIMES DAILY    fluocinonide (LIDEX) 0.05 % external solution APPLY TO SCALP 1 2 TIMES A DAY    icosapent ethyL (VASCEPA) 1 gram Cap Take 2 capsules by mouth 2 (two) times daily.    JARDIANCE 25 mg tablet Take 25 mg by mouth once daily.    LANTUS SOLOSTAR U-100 INSULIN glargine 100 units/mL (3mL) SubQ pen INJECT 35 UNITS BELOW SKIN NIGHTLY AT BEDTIME    levothyroxine (SYNTHROID) 88 MCG tablet Take 88 mcg by mouth once daily.    losartan (COZAAR) 50 MG tablet Take 50 mg by mouth once daily.    multivitamin capsule Take 1 capsule by mouth once daily.    oxyCODONE-acetaminophen (PERCOCET) 5-325 mg per tablet Take 1 tablet by mouth every 4 (four) hours as needed for Pain.    vit C-vit E-lutein-min-om-3 882-34-0-150 mg-unit-mg-mg Cap Take by mouth once daily.   Last reviewed on 10/3/2022  7:50 AM by Kaiden Richards III, MD    Review of patient's allergies indicates:   Allergen Reactions    Vicodin [hydrocodone-acetaminophen] Nausea Only and Other (See Comments)     Turned pale, Lightheaded    Last reviewed on  12/13/2022 5:54 PM by Joe Guzman      Tasks added this encounter   No tasks added.   Tasks due within next 3 months   12/8/2022 - Refill Call (Auto Added)     [facility]  [address] [facility] [address]  Phone: [phone]  Fax: [phone]        "

## 2023-01-12 ENCOUNTER — SPECIALTY PHARMACY (OUTPATIENT)
Dept: PHARMACY | Facility: CLINIC | Age: 73
End: 2023-01-12
Payer: MEDICARE

## 2023-01-12 NOTE — TELEPHONE ENCOUNTER
"Specialty Pharmacy - Refill Coordination    Specialty Medication Orders Linked to Encounter      Flowsheet Row Most Recent Value   Medication #1 apremilast (OTEZLA) 30 mg Tab (Order#[id number], Rx#[id number])            Refill Questions - Documented Responses      Flowsheet Row Most Recent Value   Patient Availability and HIPAA Verification    Does patient want to proceed with activity? Yes   HIPAA/medical authority confirmed? Yes   Relationship to patient of person spoken to? Self   Refill Screening Questions    Changes to allergies? No   Changes to medications? No   New conditions since last clinic visit? No   Unplanned office visit, urgent care, ED, or hospital admission in the last 4 weeks? No   How does patient/caregiver feel medication is working? Good   Financial problems or insurance changes? No   How many doses of your specialty medications were missed in the last 4 weeks? 0   Would patient like to speak to a pharmacist? No   When does the patient need to receive the medication? 01/19/23   Refill Delivery Questions    How will the patient receive the medication? MEDRx   When does the patient need to receive the medication? 01/19/23   Shipping Address Home   Address in [facility] confirmed and updated if neccessary? Yes   Expected Copay ($) 10.35   Is the patient able to afford the medication copay? Yes   Payment Method CC on file   Days supply of Refill 30   Supplies needed? No supplies needed   Refill activity completed? Yes   Refill activity plan Refill scheduled   Shipment/Pickup Date: 01/17/23            Current Outpatient Medications   Medication Sig    ALCOHOL PADS PadM TEST 2 TIMES DAILY    apremilast (OTEZLA) 30 mg Tab Take 1 tablet by mouth twice a day    atorvastatin (LIPITOR) 10 MG tablet     augmented betamethasone dipropionate (DIPROLENE-AF) 0.05 % cream APPLY TO THE AFFECTED AREA TWICE FOR 2-3 WEEKS THEN TAKE 1 WEEK OFF    BD ULTRA-FINE SHANI PEN NEEDLE 32 gauge x 5/32" Ndle Inject 1 " "each into the skin 3 (three) times daily.    BYDUREON BCISE 2 mg/0.85 mL AtIn     citalopram (CELEXA) 20 MG tablet Take 20 mg by mouth once daily.    COMFORT EZ INSULIN SYRINGE 1 mL 31 gauge x 5/16 Syrg INJECT SUBCUTANEOUSLY 2 TIMES DAILY    COMFORT EZ PEN NEEDLES 33 gauge x 5/32" Ndle INJECT SUBCUTANEOUSLY 2 TIMES DAILY    fluocinonide (LIDEX) 0.05 % external solution APPLY TO SCALP 1 2 TIMES A DAY    icosapent ethyL (VASCEPA) 1 gram Cap Take 2 capsules by mouth 2 (two) times daily.    JARDIANCE 25 mg tablet Take 25 mg by mouth once daily.    LANTUS SOLOSTAR U-100 INSULIN glargine 100 units/mL (3mL) SubQ pen INJECT 35 UNITS BELOW SKIN NIGHTLY AT BEDTIME    levothyroxine (SYNTHROID) 88 MCG tablet Take 88 mcg by mouth once daily.    losartan (COZAAR) 50 MG tablet Take 50 mg by mouth once daily.    multivitamin capsule Take 1 capsule by mouth once daily.    oxyCODONE-acetaminophen (PERCOCET) 5-325 mg per tablet Take 1 tablet by mouth every 4 (four) hours as needed for Pain.    vit C-vit E-lutein-min-om-3 967-95-3-150 mg-unit-mg-mg Cap Take by mouth once daily.   Last reviewed on 10/3/2022  7:50 AM by Kaiden Richards III, MD    Review of patient's allergies indicates:   Allergen Reactions    Vicodin [hydrocodone-acetaminophen] Nausea Only and Other (See Comments)     Turned pale, Lightheaded    Last reviewed on  12/13/2022 5:54 PM by Joe Guzman      Tasks added this encounter   2/11/2023 - Refill Call (Auto Added)   Tasks due within next 3 months   No tasks due.     Blanca Andrew, Patient Care Assistant  [facility]  [address] [place] [address]  Phone: [phone]  Fax: [phone]        "

## 2023-02-13 ENCOUNTER — SPECIALTY PHARMACY (OUTPATIENT)
Dept: PHARMACY | Facility: CLINIC | Age: 73
End: 2023-02-13
Payer: MEDICARE

## 2023-02-13 NOTE — TELEPHONE ENCOUNTER
"Specialty Pharmacy - Refill Coordination    Specialty Medication Orders Linked to Encounter      Flowsheet Row Most Recent Value   Medication #1 apremilast (OTEZLA) 30 mg Tab (Order#[id number], Rx#[id number])            Refill Questions - Documented Responses      Flowsheet Row Most Recent Value   Patient Availability and HIPAA Verification    Does patient want to proceed with activity? Yes   HIPAA/medical authority confirmed? Yes   Relationship to patient of person spoken to? Self   Refill Screening Questions    Changes to allergies? No   Changes to medications? No   New conditions since last clinic visit? No   Unplanned office visit, urgent care, ED, or hospital admission in the last 4 weeks? No   How does patient/caregiver feel medication is working? Good   Financial problems or insurance changes? No   How many doses of your specialty medications were missed in the last 4 weeks? 0   Would patient like to speak to a pharmacist? No   When does the patient need to receive the medication? 02/19/23   Refill Delivery Questions    How will the patient receive the medication? MEDRx   When does the patient need to receive the medication? 02/19/23   Shipping Address Home   Address in [facility] confirmed and updated if neccessary? Yes   Expected Copay ($) 10.35   Is the patient able to afford the medication copay? Yes   Payment Method CC on file   Days supply of Refill 30   Supplies needed? No supplies needed   Refill activity completed? Yes   Refill activity plan Refill scheduled   Shipment/Pickup Date: 02/15/23            Current Outpatient Medications   Medication Sig    ALCOHOL PADS PadM TEST 2 TIMES DAILY    apremilast (OTEZLA) 30 mg Tab Take 1 tablet by mouth twice a day    atorvastatin (LIPITOR) 10 MG tablet     augmented betamethasone dipropionate (DIPROLENE-AF) 0.05 % cream APPLY TO THE AFFECTED AREA TWICE FOR 2-3 WEEKS THEN TAKE 1 WEEK OFF    BD ULTRA-FINE SHANI PEN NEEDLE 32 gauge x 5/32" Ndle Inject 1 " "each into the skin 3 (three) times daily.    BYDUREON BCISE 2 mg/0.85 mL AtIn     citalopram (CELEXA) 20 MG tablet Take 20 mg by mouth once daily.    COMFORT EZ INSULIN SYRINGE 1 mL 31 gauge x 5/16 Syrg INJECT SUBCUTANEOUSLY 2 TIMES DAILY    COMFORT EZ PEN NEEDLES 33 gauge x 5/32" Ndle INJECT SUBCUTANEOUSLY 2 TIMES DAILY    fluocinonide (LIDEX) 0.05 % external solution APPLY TO SCALP 1 2 TIMES A DAY    icosapent ethyL (VASCEPA) 1 gram Cap Take 2 capsules by mouth 2 (two) times daily.    JARDIANCE 25 mg tablet Take 25 mg by mouth once daily.    LANTUS SOLOSTAR U-100 INSULIN glargine 100 units/mL (3mL) SubQ pen INJECT 35 UNITS BELOW SKIN NIGHTLY AT BEDTIME    levothyroxine (SYNTHROID) 88 MCG tablet Take 88 mcg by mouth once daily.    losartan (COZAAR) 50 MG tablet Take 50 mg by mouth once daily.    multivitamin capsule Take 1 capsule by mouth once daily.    oxyCODONE-acetaminophen (PERCOCET) 5-325 mg per tablet Take 1 tablet by mouth every 4 (four) hours as needed for Pain.    vit C-vit E-lutein-min-om-3 [id number] mg-unit-mg-mg Cap Take by mouth once daily.   Last reviewed on 10/3/2022  7:50 AM by Kaiden Richards III, MD    Review of patient's allergies indicates:   Allergen Reactions    Vicodin [hydrocodone-acetaminophen] Nausea Only and Other (See Comments)     Turned pale, Lightheaded    Last reviewed on  12/13/2022 5:54 PM by Joe Guzman      Tasks added this encounter   3/14/2023 - Refill Call (Auto Added)   Tasks due within next 3 months   No tasks due.     Marco Antonio, [facility]  [address] [facility] [address]  Phone: [phone]  Fax: [phone]        "

## 2023-03-17 ENCOUNTER — SPECIALTY PHARMACY (OUTPATIENT)
Dept: PHARMACY | Facility: CLINIC | Age: 73
End: 2023-03-17
Payer: MEDICARE

## 2023-03-17 NOTE — TELEPHONE ENCOUNTER
"Specialty Pharmacy - Refill Coordination    Specialty Medication Orders Linked to Encounter      Flowsheet Row Most Recent Value   Medication #1 apremilast (OTEZLA) 30 mg Tab (Order#[id number], Rx#[id number])            Refill Questions - Documented Responses      Flowsheet Row Most Recent Value   Patient Availability and HIPAA Verification    Does patient want to proceed with activity? Yes   HIPAA/medical authority confirmed? Yes   Relationship to patient of person spoken to? Self   Refill Screening Questions    Changes to allergies? No   Changes to medications? No   New conditions since last clinic visit? No   Unplanned office visit, urgent care, ED, or hospital admission in the last 4 weeks? No   How does patient/caregiver feel medication is working? Good   Financial problems or insurance changes? No   How many doses of your specialty medications were missed in the last 4 weeks? 0   Would patient like to speak to a pharmacist? No   When does the patient need to receive the medication? 03/23/23   Refill Delivery Questions    How will the patient receive the medication? MEDRx   When does the patient need to receive the medication? 03/23/23   Shipping Address Home   Address in [facility] confirmed and updated if neccessary? Yes   Expected Copay ($) 0   Is the patient able to afford the medication copay? Yes   Payment Method zero copay   Days supply of Refill 30   Supplies needed? No supplies needed   Refill activity completed? Yes   Refill activity plan Refill scheduled   Shipment/Pickup Date: 03/21/23            Current Outpatient Medications   Medication Sig    ALCOHOL PADS PadM TEST 2 TIMES DAILY    apremilast (OTEZLA) 30 mg Tab Take 1 tablet by mouth twice a day    atorvastatin (LIPITOR) 10 MG tablet     augmented betamethasone dipropionate (DIPROLENE-AF) 0.05 % cream APPLY TO THE AFFECTED AREA TWICE FOR 2-3 WEEKS THEN TAKE 1 WEEK OFF    BD ULTRA-FINE SHANI PEN NEEDLE 32 gauge x 5/32" Ndle Inject 1 each " "into the skin 3 (three) times daily.    BYDUREON BCISE 2 mg/0.85 mL AtIn     citalopram (CELEXA) 20 MG tablet Take 20 mg by mouth once daily.    COMFORT EZ INSULIN SYRINGE 1 mL 31 gauge x 5/16 Syrg INJECT SUBCUTANEOUSLY 2 TIMES DAILY    COMFORT EZ PEN NEEDLES 33 gauge x 5/32" Ndle INJECT SUBCUTANEOUSLY 2 TIMES DAILY    fluocinonide (LIDEX) 0.05 % external solution APPLY TO SCALP 1 2 TIMES A DAY    icosapent ethyL (VASCEPA) 1 gram Cap Take 2 capsules by mouth 2 (two) times daily.    JARDIANCE 25 mg tablet Take 25 mg by mouth once daily.    LANTUS SOLOSTAR U-100 INSULIN glargine 100 units/mL (3mL) SubQ pen INJECT 35 UNITS BELOW SKIN NIGHTLY AT BEDTIME    levothyroxine (SYNTHROID) 88 MCG tablet Take 88 mcg by mouth once daily.    losartan (COZAAR) 50 MG tablet Take 50 mg by mouth once daily.    multivitamin capsule Take 1 capsule by mouth once daily.    oxyCODONE-acetaminophen (PERCOCET) 5-325 mg per tablet Take 1 tablet by mouth every 4 (four) hours as needed for Pain.    vit C-vit E-lutein-min-om-3 [id number] mg-unit-mg-mg Cap Take by mouth once daily.   Last reviewed on 10/3/2022  7:50 AM by Kaiden Richards III, MD    Review of patient's allergies indicates:   Allergen Reactions    Vicodin [hydrocodone-acetaminophen] Nausea Only and Other (See Comments)     Turned pale, Lightheaded    Last reviewed on  12/13/2022 5:54 PM by Joe Guzman      Tasks added this encounter   4/15/2023 - Refill Call (Auto Added)   Tasks due within next 3 months   No tasks due.     [facility] - Specialty Pharmacy  [address] [facility] [address]  Phone: [phone]  Fax: [phone]        "

## 2023-04-05 ENCOUNTER — HOSPITAL ENCOUNTER (EMERGENCY)
Facility: HOSPITAL | Age: 73
Discharge: HOME | End: 2023-04-05
Attending: EMERGENCY MEDICINE
Payer: MEDICARE

## 2023-04-05 VITALS
RESPIRATION RATE: 20 BRPM | HEART RATE: 92 BPM | DIASTOLIC BLOOD PRESSURE: 77 MMHG | SYSTOLIC BLOOD PRESSURE: 156 MMHG | HEIGHT: 65 IN | BODY MASS INDEX: 32.49 KG/M2 | WEIGHT: 195 LBS | TEMPERATURE: 97 F | OXYGEN SATURATION: 96 %

## 2023-04-05 DIAGNOSIS — R31.9 HEMATURIA, UNSPECIFIED TYPE: Primary | ICD-10-CM

## 2023-04-05 PROBLEM — R31.0 GROSS HEMATURIA: Status: ACTIVE | Noted: 2023-04-05

## 2023-04-05 LAB
ANION GAP SERPL CALC-SCNC: 9 MMOL/L (ref 8–16)
BACTERIA #/AREA URNS HPF: ABNORMAL /HPF
BASOPHILS # BLD AUTO: 0.06 K/UL (ref 0–0.2)
BASOPHILS NFR BLD: 0.3 % (ref 0–1.9)
BILIRUB UR QL STRIP: NEGATIVE
BUN SERPL-MCNC: 7 MG/DL (ref 8–23)
CALCIUM SERPL-MCNC: 9.6 MG/DL (ref 8.7–10.5)
CHLORIDE SERPL-SCNC: 106 MMOL/L (ref 95–110)
CLARITY UR: ABNORMAL
CO2 SERPL-SCNC: 25 MMOL/L (ref 23–29)
COLOR UR: ABNORMAL
CREAT SERPL-MCNC: 1.3 MG/DL (ref 0.5–1.4)
DIFFERENTIAL METHOD: ABNORMAL
EOSINOPHIL # BLD AUTO: 0.1 K/UL (ref 0–0.5)
EOSINOPHIL NFR BLD: 0.8 % (ref 0–8)
ERYTHROCYTE [DISTWIDTH] IN BLOOD BY AUTOMATED COUNT: 13.8 % (ref 11.5–14.5)
EST. GFR  (NO RACE VARIABLE): 59 ML/MIN/1.73 M^2
GLUCOSE SERPL-MCNC: 158 MG/DL (ref 70–110)
GLUCOSE UR QL STRIP: ABNORMAL
HCT VFR BLD AUTO: 40.1 % (ref 40–54)
HGB BLD-MCNC: 13.6 G/DL (ref 14–18)
HGB UR QL STRIP: ABNORMAL
HYALINE CASTS #/AREA URNS LPF: 0 /LPF
IMM GRANULOCYTES # BLD AUTO: 0.07 K/UL (ref 0–0.04)
IMM GRANULOCYTES NFR BLD AUTO: 0.4 % (ref 0–0.5)
KETONES UR QL STRIP: NEGATIVE
LEUKOCYTE ESTERASE UR QL STRIP: ABNORMAL
LYMPHOCYTES # BLD AUTO: 1.5 K/UL (ref 1–4.8)
LYMPHOCYTES NFR BLD: 8.9 % (ref 18–48)
MCH RBC QN AUTO: 28.9 PG (ref 27–31)
MCHC RBC AUTO-ENTMCNC: 33.9 G/DL (ref 32–36)
MCV RBC AUTO: 85 FL (ref 82–98)
MICROSCOPIC COMMENT: ABNORMAL
MONOCYTES # BLD AUTO: 1.1 K/UL (ref 0.3–1)
MONOCYTES NFR BLD: 6.6 % (ref 4–15)
NEUTROPHILS # BLD AUTO: 14.3 K/UL (ref 1.8–7.7)
NEUTROPHILS NFR BLD: 83 % (ref 38–73)
NITRITE UR QL STRIP: NEGATIVE
NRBC BLD-RTO: 0 /100 WBC
PH UR STRIP: 7 [PH] (ref 5–8)
PLATELET # BLD AUTO: 258 K/UL (ref 150–450)
PMV BLD AUTO: 10.4 FL (ref 9.2–12.9)
POTASSIUM SERPL-SCNC: 3.4 MMOL/L (ref 3.5–5.1)
PROT UR QL STRIP: ABNORMAL
RBC # BLD AUTO: 4.7 M/UL (ref 4.6–6.2)
RBC #/AREA URNS HPF: >100 /HPF (ref 0–4)
SODIUM SERPL-SCNC: 140 MMOL/L (ref 136–145)
SP GR UR STRIP: 1.01 (ref 1–1.03)
URN SPEC COLLECT METH UR: ABNORMAL
UROBILINOGEN UR STRIP-ACNC: NEGATIVE EU/DL
WBC # BLD AUTO: 17.2 K/UL (ref 3.9–12.7)
WBC #/AREA URNS HPF: 2 /HPF (ref 0–5)

## 2023-04-05 PROCEDURE — 99285 EMERGENCY DEPT VISIT HI MDM: CPT | Mod: ,,, | Performed by: STUDENT IN AN ORGANIZED HEALTH CARE EDUCATION/TRAINING PROGRAM

## 2023-04-05 PROCEDURE — 85025 COMPLETE CBC W/AUTO DIFF WBC: CPT | Performed by: EMERGENCY MEDICINE

## 2023-04-05 PROCEDURE — 36415 COLL VENOUS BLD VENIPUNCTURE: CPT | Performed by: EMERGENCY MEDICINE

## 2023-04-05 PROCEDURE — 81000 URINALYSIS NONAUTO W/SCOPE: CPT | Performed by: EMERGENCY MEDICINE

## 2023-04-05 PROCEDURE — 25500020 PHARM REV CODE 255

## 2023-04-05 PROCEDURE — 51702 INSERT TEMP BLADDER CATH: CPT

## 2023-04-05 PROCEDURE — 99285 PR EMERGENCY DEPT VISIT,LEVEL V: ICD-10-PCS | Mod: ,,, | Performed by: STUDENT IN AN ORGANIZED HEALTH CARE EDUCATION/TRAINING PROGRAM

## 2023-04-05 PROCEDURE — 99285 EMERGENCY DEPT VISIT HI MDM: CPT | Mod: 25

## 2023-04-05 PROCEDURE — 80048 BASIC METABOLIC PNL TOTAL CA: CPT | Performed by: EMERGENCY MEDICINE

## 2023-04-05 RX ORDER — DULAGLUTIDE 0.75 MG/.5ML
0.75 INJECTION, SOLUTION SUBCUTANEOUS
COMMUNITY
Start: 2023-02-06

## 2023-04-05 RX ORDER — LIDOCAINE HYDROCHLORIDE 20 MG/ML
JELLY TOPICAL
Status: DISCONTINUED | OUTPATIENT
Start: 2023-04-05 | End: 2023-04-05 | Stop reason: HOSPADM

## 2023-04-05 RX ADMIN — IOHEXOL 125 ML: 350 INJECTION, SOLUTION INTRAVENOUS at 09:04
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
## 2023-04-05 NOTE — ED NOTES
Pt adamant about not having dhillon catheter placed. ED MD notified & verified by same. Awaiting dispo

## 2023-04-05 NOTE — ASSESSMENT & PLAN NOTE
- Discussed need for dhillon catheter placement. Bilateral hydro secondary to bladder outlet obstruction vs mass within the bladder  - Will need cysto   - Patient refused dhillon placement by nursing staff and myself. States he is leaving the ED.   - Refused going to OR for dhillon and cysto   - Suspect he may have recurrence of his urethral stricture   - Discussed risk of further kidney damage and continued urinary retention, however he stated he will just drink less water to resolve   - He did agree to an office cysto next week. Scheduled for 4/10 at 3:30.   - Ok to start Flomax

## 2023-04-05 NOTE — HPI
Carson Griffith is a 72 y.o. male who presented to the ED today with acute onset of gross hematuria.     He states this started this am.   He claims to have no difficulty urinating however he is incontinent.   No n/v  No fevers    Hx of urethral stricture which was dilated by Dr. Fairchild in 2013, no urologic follow up since that time.     CT Urogram today shows severe bilateral hydronephrosis to the bladder. There is also bladder wall thickening most prominent on the left side.   WBC 17  GFR 59  UA not overly concerning for infection
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
## 2023-04-21 ENCOUNTER — SPECIALTY PHARMACY (OUTPATIENT)
Dept: PHARMACY | Facility: CLINIC | Age: 73
End: 2023-04-21
Payer: MEDICARE

## 2023-04-21 NOTE — TELEPHONE ENCOUNTER
Specialty Pharmacy - Refill Coordination    Specialty Medication Orders Linked to Encounter      Flowsheet Row Most Recent Value   Medication #1 apremilast (OTEZLA) 30 mg Tab (Order#[id number], Rx#[id number])        Reviewed most recent BMP to ensure Otezla dosing still renally appropriate.     Refill Questions - Documented Responses      Flowsheet Row Most Recent Value   Patient Availability and HIPAA Verification    Does patient want to proceed with activity? Yes   HIPAA/medical authority confirmed? Yes   Relationship to patient of person spoken to? Self   Refill Screening Questions    Changes to allergies? No   Changes to medications? No   New conditions since last clinic visit? No   Unplanned office visit, urgent care, ED, or hospital admission in the last 4 weeks? Yes  [ER visit on 4/5/23 for hematuria and suspected recurrence of urethral stricture. Per chart notes pt refused dhillon or cystoscopy in the ER and left. They did not suspect infection at that time. Per pt feeling better since then.]   How does patient/caregiver feel medication is working? Fair   Financial problems or insurance changes? No   How many doses of your specialty medications were missed in the last 4 weeks? 0   Would patient like to speak to a pharmacist? No   When does the patient need to receive the medication? 04/27/23   Refill Delivery Questions    How will the patient receive the medication? MEDRx   When does the patient need to receive the medication? 04/27/23   Shipping Address Home   Address in [facility] confirmed and updated if neccessary? Yes   Expected Copay ($) 0   Is the patient able to afford the medication copay? Yes   Payment Method zero copay   Days supply of Refill 30   Supplies needed? No supplies needed   Refill activity completed? Yes   Refill activity plan Refill scheduled   Shipment/Pickup Date: 04/24/23            Current Outpatient Medications   Medication Sig    ALCOHOL PADS PadM TEST 2 TIMES DAILY     "apremilast (OTEZLA) 30 mg Tab Take 1 tablet by mouth twice a day    atorvastatin (LIPITOR) 10 MG tablet     augmented betamethasone dipropionate (DIPROLENE-AF) 0.05 % cream APPLY TO THE AFFECTED AREA TWICE FOR 2-3 WEEKS THEN TAKE 1 WEEK OFF    BD ULTRA-FINE SHANI PEN NEEDLE 32 gauge x 5/32" Ndle Inject 1 each into the skin 3 (three) times daily.    BYDUREON BCISE 2 mg/0.85 mL AtIn     citalopram (CELEXA) 20 MG tablet Take 20 mg by mouth once daily.    COMFORT EZ INSULIN SYRINGE 1 mL 31 gauge x 5/16 Syrg INJECT SUBCUTANEOUSLY 2 TIMES DAILY    COMFORT EZ PEN NEEDLES 33 gauge x 5/32" Ndle INJECT SUBCUTANEOUSLY 2 TIMES DAILY    FENUGREEK SEED-BL.THISTLE-ANIS ORAL Take by mouth.    fluocinonide (LIDEX) 0.05 % external solution APPLY TO SCALP 1 2 TIMES A DAY    icosapent ethyL (VASCEPA) 1 gram Cap Take 2 capsules by mouth 2 (two) times daily.    JARDIANCE 25 mg tablet Take 25 mg by mouth once daily.    LANTUS SOLOSTAR U-100 INSULIN glargine 100 units/mL (3mL) SubQ pen INJECT 35 UNITS BELOW SKIN NIGHTLY AT BEDTIME    levothyroxine (SYNTHROID) 88 MCG tablet Take 88 mcg by mouth once daily.    losartan (COZAAR) 50 MG tablet Take 50 mg by mouth once daily.    multivitamin capsule Take 1 capsule by mouth once daily.    oxyCODONE-acetaminophen (PERCOCET) 5-325 mg per tablet Take 1 tablet by mouth every 4 (four) hours as needed for Pain.    TRULICITY 0.75 mg/0.5 mL pen injector Inject into the skin.    vit C-vit E-lutein-min-om-3 962-41-8-150 mg-unit-mg-mg Cap Take by mouth once daily.   Last reviewed on 4/5/2023  6:30 AM by Matilde Guillaume RN    Review of patient's allergies indicates:   Allergen Reactions    Vicodin [hydrocodone-acetaminophen] Nausea Only and Other (See Comments)     Turned pale, Lightheaded    Last reviewed on  4/5/2023 9:21 AM by Jocelyn Draper      Tasks added this encounter   5/20/2023 - Refill Call (Auto Added)   Tasks due within next 3 months   No tasks due.     Nara Sapp, PharmD  [facility] " [facility]  [facility] [facility] [address]  Phone: [phone]  Fax: [phone]

## 2023-05-17 ENCOUNTER — SPECIALTY PHARMACY (OUTPATIENT)
Dept: PHARMACY | Facility: CLINIC | Age: 73
End: 2023-05-17
Payer: MEDICARE

## 2023-05-17 NOTE — TELEPHONE ENCOUNTER
"Specialty Pharmacy - Refill Coordination    Specialty Medication Orders Linked to Encounter      Flowsheet Row Most Recent Value   Medication #1 apremilast (OTEZLA) 30 mg Tab (Order#[id number], Rx#[id number])            Refill Questions - Documented Responses      Flowsheet Row Most Recent Value   Patient Availability and HIPAA Verification    Does patient want to proceed with activity? Yes   HIPAA/medical authority confirmed? Yes   Relationship to patient of person spoken to? Self   Refill Screening Questions    Changes to allergies? No   Changes to medications? No   New conditions since last clinic visit? No   Unplanned office visit, urgent care, ED, or hospital admission in the last 4 weeks? No   How does patient/caregiver feel medication is working? Good   Financial problems or insurance changes? No   How many doses of your specialty medications were missed in the last 4 weeks? 0   Would patient like to speak to a pharmacist? No   When does the patient need to receive the medication? 05/24/23   Refill Delivery Questions    How will the patient receive the medication? MEDRx   When does the patient need to receive the medication? 05/24/23   Shipping Address Home   Address in [facility] confirmed and updated if neccessary? Yes   Expected Copay ($) 0   Is the patient able to afford the medication copay? Yes   Payment Method zero copay   Days supply of Refill 30   Supplies needed? No supplies needed   Refill activity completed? Yes   Refill activity plan Refill scheduled   Shipment/Pickup Date: 05/19/23            Current Outpatient Medications   Medication Sig    ALCOHOL PADS PadM TEST 2 TIMES DAILY    apremilast (OTEZLA) 30 mg Tab Take 1 tablet by mouth twice a day    atorvastatin (LIPITOR) 10 MG tablet     augmented betamethasone dipropionate (DIPROLENE-AF) 0.05 % cream APPLY TO THE AFFECTED AREA TWICE FOR 2-3 WEEKS THEN TAKE 1 WEEK OFF    BD ULTRA-FINE SHANI PEN NEEDLE 32 gauge x 5/32" Ndle Inject 1 each " "into the skin 3 (three) times daily.    BYDUREON BCISE 2 mg/0.85 mL AtIn     citalopram (CELEXA) 20 MG tablet Take 20 mg by mouth once daily.    COMFORT EZ INSULIN SYRINGE 1 mL 31 gauge x 5/16 Syrg INJECT SUBCUTANEOUSLY 2 TIMES DAILY    COMFORT EZ PEN NEEDLES 33 gauge x 5/32" Ndle INJECT SUBCUTANEOUSLY 2 TIMES DAILY    FENUGREEK SEED-BL.THISTLE-ANIS ORAL Take by mouth.    fluocinonide (LIDEX) 0.05 % external solution APPLY TO SCALP 1 2 TIMES A DAY    icosapent ethyL (VASCEPA) 1 gram Cap Take 2 capsules by mouth 2 (two) times daily.    JARDIANCE 25 mg tablet Take 25 mg by mouth once daily.    LANTUS SOLOSTAR U-100 INSULIN glargine 100 units/mL (3mL) SubQ pen INJECT 35 UNITS BELOW SKIN NIGHTLY AT BEDTIME    levothyroxine (SYNTHROID) 88 MCG tablet Take 88 mcg by mouth once daily.    losartan (COZAAR) 50 MG tablet Take 50 mg by mouth once daily.    multivitamin capsule Take 1 capsule by mouth once daily.    oxyCODONE-acetaminophen (PERCOCET) 5-325 mg per tablet Take 1 tablet by mouth every 4 (four) hours as needed for Pain.    TRULICITY 0.75 mg/0.5 mL pen injector Inject into the skin.    vit C-vit E-lutein-min-om-3 053-28-2-150 mg-unit-mg-mg Cap Take by mouth once daily.   Last reviewed on 4/5/2023  6:30 AM by Matilde Guillaume RN    Review of patient's allergies indicates:   Allergen Reactions    Vicodin [hydrocodone-acetaminophen] Nausea Only and Other (See Comments)     Turned pale, Lightheaded    Last reviewed on  4/5/2023 9:21 AM by Jocelyn Draper      Tasks added this encounter   No tasks added.   Tasks due within next 3 months   5/20/2023 - Refill Coordination Outreach (1 time occurrence)  5/17/2023 - Refill Coordination Outreach (1 time occurrence)     Shayy Milan  [facility] - Specialty Pharmacy  [address] [place] [address]  Phone: [phone]  Fax: [phone]        "

## 2023-06-15 ENCOUNTER — SPECIALTY PHARMACY (OUTPATIENT)
Dept: PHARMACY | Facility: CLINIC | Age: 73
End: 2023-06-15
Payer: MEDICARE

## 2023-06-15 DIAGNOSIS — L40.9 PSORIASIS: Primary | ICD-10-CM

## 2023-06-15 NOTE — TELEPHONE ENCOUNTER
Incoming call from pt for Otezla refill, pt reported 14 days on hand and is okay with a call back. Routing to [facility] to adjust refill date

## 2023-06-21 NOTE — TELEPHONE ENCOUNTER
"Specialty Pharmacy - Refill Coordination    Specialty Medication Orders Linked to Encounter      Flowsheet Row Most Recent Value   Medication #1 apremilast (OTEZLA) 30 mg Tab (Order#[id number], Rx#[id number])            Refill Questions - Documented Responses      Flowsheet Row Most Recent Value   Patient Availability and HIPAA Verification    Does patient want to proceed with activity? Yes   HIPAA/medical authority confirmed? Yes   Relationship to patient of person spoken to? Self   Refill Screening Questions    Changes to allergies? No   Changes to medications? No   New conditions since last clinic visit? No   Unplanned office visit, urgent care, ED, or hospital admission in the last 4 weeks? No   How does patient/caregiver feel medication is working? Good   Financial problems or insurance changes? No   How many doses of your specialty medications were missed in the last 4 weeks? 0   Would patient like to speak to a pharmacist? No   When does the patient need to receive the medication? 06/30/23   Refill Delivery Questions    How will the patient receive the medication? MEDRx   When does the patient need to receive the medication? 06/30/23   Shipping Address Home   Address in [facility] confirmed and updated if neccessary? Yes   Expected Copay ($) 0   Is the patient able to afford the medication copay? Yes   Payment Method zero copay   Days supply of Refill 30   Supplies needed? No supplies needed   Refill activity completed? Yes   Refill activity plan Refill scheduled   Shipment/Pickup Date: 06/27/23            Current Outpatient Medications   Medication Sig    ALCOHOL PADS PadM TEST 2 TIMES DAILY    apremilast (OTEZLA) 30 mg Tab Take 1 tablet by mouth twice a day    atorvastatin (LIPITOR) 10 MG tablet     augmented betamethasone dipropionate (DIPROLENE-AF) 0.05 % cream APPLY TO THE AFFECTED AREA TWICE FOR 2-3 WEEKS THEN TAKE 1 WEEK OFF    BD ULTRA-FINE SHANI PEN NEEDLE 32 gauge x 5/32" Ndle Inject 1 each " "into the skin 3 (three) times daily.    BYDUREON BCISE 2 mg/0.85 mL AtIn     citalopram (CELEXA) 20 MG tablet Take 20 mg by mouth once daily.    COMFORT EZ INSULIN SYRINGE 1 mL 31 gauge x 5/16 Syrg INJECT SUBCUTANEOUSLY 2 TIMES DAILY    COMFORT EZ PEN NEEDLES 33 gauge x 5/32" Ndle INJECT SUBCUTANEOUSLY 2 TIMES DAILY    FENUGREEK SEED-BL.THISTLE-ANIS ORAL Take by mouth.    fluocinonide (LIDEX) 0.05 % external solution APPLY TO SCALP 1 2 TIMES A DAY    icosapent ethyL (VASCEPA) 1 gram Cap Take 2 capsules by mouth 2 (two) times daily.    JARDIANCE 25 mg tablet Take 25 mg by mouth once daily.    LANTUS SOLOSTAR U-100 INSULIN glargine 100 units/mL (3mL) SubQ pen INJECT 35 UNITS BELOW SKIN NIGHTLY AT BEDTIME    levothyroxine (SYNTHROID) 88 MCG tablet Take 88 mcg by mouth once daily.    losartan (COZAAR) 50 MG tablet Take 50 mg by mouth once daily.    multivitamin capsule Take 1 capsule by mouth once daily.    oxyCODONE-acetaminophen (PERCOCET) 5-325 mg per tablet Take 1 tablet by mouth every 4 (four) hours as needed for Pain.    TRULICITY 0.75 mg/0.5 mL pen injector Inject into the skin.    vit C-vit E-lutein-min-om-3 439-74-8-150 mg-unit-mg-mg Cap Take by mouth once daily.   Last reviewed on 4/5/2023  6:30 AM by Matilde Guillaume RN    Review of patient's allergies indicates:   Allergen Reactions    Vicodin [hydrocodone-acetaminophen] Nausea Only and Other (See Comments)     Turned pale, Lightheaded    Last reviewed on  4/5/2023 9:21 AM by Jocelyn Draper      Tasks added this encounter   No tasks added.   Tasks due within next 3 months   6/22/2023 - Refill Coordination Outreach (1 time occurrence)     Kaela Schwab, PharmD  [facility] - Specialty Pharmacy  [address] [place] [address]  Phone: [phone]  Fax: [phone]        "

## 2023-07-24 ENCOUNTER — SPECIALTY PHARMACY (OUTPATIENT)
Dept: PHARMACY | Facility: CLINIC | Age: 73
End: 2023-07-24
Payer: MEDICARE

## 2023-07-24 NOTE — TELEPHONE ENCOUNTER
"Specialty Pharmacy - Refill Coordination    Specialty Medication Orders Linked to Encounter      Flowsheet Row Most Recent Value   Medication #1 apremilast (OTEZLA) 30 mg Tab (Order#[id number], Rx#[id number])            Refill Questions - Documented Responses      Flowsheet Row Most Recent Value   Patient Availability and HIPAA Verification    Does patient want to proceed with activity? Yes   HIPAA/medical authority confirmed? Yes   Relationship to patient of person spoken to? Self   Refill Screening Questions    Changes to allergies? No   Changes to medications? No   New conditions since last clinic visit? No   Unplanned office visit, urgent care, ED, or hospital admission in the last 4 weeks? No   How does patient/caregiver feel medication is working? Good  [Otezla is helping PsO. Has noticed decreased appetite did inform pt this is a potential side effect and thus potential weight loss. Asked if pt is concerned but states no not worried and he "could lose a few pounds". Declined offer to send MDO message.]   Financial problems or insurance changes? No   How many doses of your specialty medications were missed in the last 4 weeks? 0   Would patient like to speak to a pharmacist? No   When does the patient need to receive the medication? 08/01/23   Refill Delivery Questions    How will the patient receive the medication? MEDRx   When does the patient need to receive the medication? 08/01/23   Shipping Address Home   Address in [facility] confirmed and updated if neccessary? Yes   Expected Copay ($) 0   Is the patient able to afford the medication copay? Yes   Payment Method zero copay   Days supply of Refill 30   Supplies needed? No supplies needed   Refill activity completed? Yes   Refill activity plan Refill scheduled   Shipment/Pickup Date: 07/26/23            Current Outpatient Medications   Medication Sig    ALCOHOL PADS PadM TEST 2 TIMES DAILY    apremilast (OTEZLA) 30 mg Tab Take 1 tablet by mouth " "twice a day    atorvastatin (LIPITOR) 10 MG tablet     augmented betamethasone dipropionate (DIPROLENE-AF) 0.05 % cream APPLY TO THE AFFECTED AREA TWICE FOR 2-3 WEEKS THEN TAKE 1 WEEK OFF    BD ULTRA-FINE SHANI PEN NEEDLE 32 gauge x 5/32" Ndle Inject 1 each into the skin 3 (three) times daily.    BYDUREON BCISE 2 mg/0.85 mL AtIn     citalopram (CELEXA) 20 MG tablet Take 20 mg by mouth once daily.    COMFORT EZ INSULIN SYRINGE 1 mL 31 gauge x 5/16 Syrg INJECT SUBCUTANEOUSLY 2 TIMES DAILY    COMFORT EZ PEN NEEDLES 33 gauge x 5/32" Ndle INJECT SUBCUTANEOUSLY 2 TIMES DAILY    FENUGREEK SEED-BL.THISTLE-ANIS ORAL Take by mouth.    fluocinonide (LIDEX) 0.05 % external solution APPLY TO SCALP 1 2 TIMES A DAY    icosapent ethyL (VASCEPA) 1 gram Cap Take 2 capsules by mouth 2 (two) times daily.    JARDIANCE 25 mg tablet Take 25 mg by mouth once daily.    LANTUS SOLOSTAR U-100 INSULIN glargine 100 units/mL (3mL) SubQ pen INJECT 35 UNITS BELOW SKIN NIGHTLY AT BEDTIME    levothyroxine (SYNTHROID) 88 MCG tablet Take 88 mcg by mouth once daily.    losartan (COZAAR) 50 MG tablet Take 50 mg by mouth once daily.    multivitamin capsule Take 1 capsule by mouth once daily.    oxyCODONE-acetaminophen (PERCOCET) 5-325 mg per tablet Take 1 tablet by mouth every 4 (four) hours as needed for Pain.    TRULICITY 0.75 mg/0.5 mL pen injector Inject into the skin.    vit C-vit E-lutein-min-om-3 149-62-6-150 mg-unit-mg-mg Cap Take by mouth once daily.   Last reviewed on 4/5/2023  6:30 AM by Matilde Guillaume RN    Review of patient's allergies indicates:   Allergen Reactions    Vicodin [hydrocodone-acetaminophen] Nausea Only and Other (See Comments)     Turned pale, Lightheaded    Last reviewed on  4/5/2023 9:21 AM by Jocelyn Draper      Tasks added this encounter   No tasks added.   Tasks due within next 3 months   No tasks due.     Nara Sapp, PharmD  [facility] - Specialty Pharmacy  [address] [facility] " [address]  Phone: [phone]  Fax: [phone]

## 2023-08-16 DIAGNOSIS — N39.0 UTI (URINARY TRACT INFECTION): Primary | ICD-10-CM

## 2023-08-16 DIAGNOSIS — R74.8 ACID PHOSPHATASE ELEVATED: ICD-10-CM

## 2023-08-19 ENCOUNTER — HOSPITAL ENCOUNTER (OUTPATIENT)
Dept: RADIOLOGY | Facility: HOSPITAL | Age: 73
Discharge: HOME | End: 2023-08-19
Attending: INTERNAL MEDICINE
Payer: MEDICARE

## 2023-08-19 DIAGNOSIS — R74.8 ACID PHOSPHATASE ELEVATED: ICD-10-CM

## 2023-08-19 DIAGNOSIS — N39.0 UTI (URINARY TRACT INFECTION): ICD-10-CM

## 2023-08-19 PROCEDURE — 74176 CT ABD & PELVIS W/O CONTRAST: CPT | Mod: TC

## 2023-08-19 PROCEDURE — 74176 CT ABDOMEN PELVIS WITHOUT CONTRAST: ICD-10-PCS | Mod: 26,,, | Performed by: RADIOLOGY

## 2023-08-19 PROCEDURE — 74176 CT ABD & PELVIS W/O CONTRAST: CPT | Mod: 26,,, | Performed by: RADIOLOGY

## 2023-09-13 ENCOUNTER — HOSPITAL ENCOUNTER (OUTPATIENT)
Facility: HOSPITAL | Age: 73
Discharge: HOME HEALTH | End: 2023-09-14
Attending: EMERGENCY MEDICINE | Admitting: INTERNAL MEDICINE
Payer: MEDICARE

## 2023-09-13 ENCOUNTER — TELEPHONE (OUTPATIENT)
Dept: UROLOGY | Facility: CLINIC | Age: 73
End: 2023-09-13

## 2023-09-13 DIAGNOSIS — N35.812 OTHER STRICTURE OF BULBOUS URETHRA IN MALE: Primary | ICD-10-CM

## 2023-09-13 DIAGNOSIS — R33.9 URINARY RETENTION: ICD-10-CM

## 2023-09-13 DIAGNOSIS — R31.0 GROSS HEMATURIA: ICD-10-CM

## 2023-09-13 DIAGNOSIS — N13.30 HYDRONEPHROSIS, BILATERAL: ICD-10-CM

## 2023-09-13 DIAGNOSIS — N13.39 OTHER HYDRONEPHROSIS: ICD-10-CM

## 2023-09-13 DIAGNOSIS — E87.6 HYPOKALEMIA: ICD-10-CM

## 2023-09-13 DIAGNOSIS — N35.912 BULBOUS URETHRAL STRICTURE: ICD-10-CM

## 2023-09-13 DIAGNOSIS — C61 PROSTATE CANCER: ICD-10-CM

## 2023-09-13 DIAGNOSIS — R33.9 URINARY RETENTION: Primary | ICD-10-CM

## 2023-09-13 PROBLEM — E11.9 DIABETES: Status: ACTIVE | Noted: 2023-09-13

## 2023-09-13 LAB
ALBUMIN SERPL BCP-MCNC: 3.5 G/DL (ref 3.5–5.2)
ALP SERPL-CCNC: 149 U/L (ref 55–135)
ALT SERPL W/O P-5'-P-CCNC: 15 U/L (ref 10–44)
ANION GAP SERPL CALC-SCNC: 12 MMOL/L (ref 8–16)
AST SERPL-CCNC: 25 U/L (ref 10–40)
BACTERIA #/AREA URNS HPF: NORMAL /HPF
BASOPHILS # BLD AUTO: 0.05 K/UL (ref 0–0.2)
BASOPHILS NFR BLD: 0.7 % (ref 0–1.9)
BILIRUB SERPL-MCNC: 0.6 MG/DL (ref 0.1–1)
BILIRUB UR QL STRIP: NEGATIVE
BUN SERPL-MCNC: 6 MG/DL (ref 8–23)
CALCIUM SERPL-MCNC: 9.3 MG/DL (ref 8.7–10.5)
CHLORIDE SERPL-SCNC: 108 MMOL/L (ref 95–110)
CLARITY UR: CLEAR
CO2 SERPL-SCNC: 25 MMOL/L (ref 23–29)
COLOR UR: COLORLESS
COMPLEXED PSA SERPL-MCNC: 0.36 NG/ML (ref 0–4)
CREAT SERPL-MCNC: 1.3 MG/DL (ref 0.5–1.4)
DIFFERENTIAL METHOD: ABNORMAL
EOSINOPHIL # BLD AUTO: 0.3 K/UL (ref 0–0.5)
EOSINOPHIL NFR BLD: 3.3 % (ref 0–8)
ERYTHROCYTE [DISTWIDTH] IN BLOOD BY AUTOMATED COUNT: 14.6 % (ref 11.5–14.5)
EST. GFR  (NO RACE VARIABLE): 59 ML/MIN/1.73 M^2
GLUCOSE SERPL-MCNC: 87 MG/DL (ref 70–110)
GLUCOSE UR QL STRIP: ABNORMAL
HCT VFR BLD AUTO: 35.4 % (ref 40–54)
HGB BLD-MCNC: 12.2 G/DL (ref 14–18)
HGB UR QL STRIP: NEGATIVE
IMM GRANULOCYTES # BLD AUTO: 0.01 K/UL (ref 0–0.04)
IMM GRANULOCYTES NFR BLD AUTO: 0.1 % (ref 0–0.5)
KETONES UR QL STRIP: NEGATIVE
LEUKOCYTE ESTERASE UR QL STRIP: NEGATIVE
LYMPHOCYTES # BLD AUTO: 2 K/UL (ref 1–4.8)
LYMPHOCYTES NFR BLD: 26.6 % (ref 18–48)
MAGNESIUM SERPL-MCNC: 1.9 MG/DL (ref 1.6–2.6)
MCH RBC QN AUTO: 27.2 PG (ref 27–31)
MCHC RBC AUTO-ENTMCNC: 34.5 G/DL (ref 32–36)
MCV RBC AUTO: 79 FL (ref 82–98)
MICROSCOPIC COMMENT: NORMAL
MONOCYTES # BLD AUTO: 0.7 K/UL (ref 0.3–1)
MONOCYTES NFR BLD: 8.6 % (ref 4–15)
NEUTROPHILS # BLD AUTO: 4.6 K/UL (ref 1.8–7.7)
NEUTROPHILS NFR BLD: 60.7 % (ref 38–73)
NITRITE UR QL STRIP: NEGATIVE
NRBC BLD-RTO: 0 /100 WBC
PH UR STRIP: 6 [PH] (ref 5–8)
PLATELET # BLD AUTO: 180 K/UL (ref 150–450)
PMV BLD AUTO: 10.2 FL (ref 9.2–12.9)
POTASSIUM SERPL-SCNC: 2.8 MMOL/L (ref 3.5–5.1)
PROT SERPL-MCNC: 6.9 G/DL (ref 6–8.4)
PROT UR QL STRIP: NEGATIVE
RBC # BLD AUTO: 4.48 M/UL (ref 4.6–6.2)
SODIUM SERPL-SCNC: 145 MMOL/L (ref 136–145)
SP GR UR STRIP: <1.005 (ref 1–1.03)
SQUAMOUS #/AREA URNS HPF: 0 /HPF
URN SPEC COLLECT METH UR: ABNORMAL
UROBILINOGEN UR STRIP-ACNC: NEGATIVE EU/DL
WBC # BLD AUTO: 7.59 K/UL (ref 3.9–12.7)
WBC #/AREA URNS HPF: 1 /HPF (ref 0–5)
YEAST URNS QL MICRO: NORMAL

## 2023-09-13 PROCEDURE — G0378 HOSPITAL OBSERVATION PER HR: HCPCS

## 2023-09-13 PROCEDURE — 84153 ASSAY OF PSA TOTAL: CPT | Performed by: UROLOGY

## 2023-09-13 PROCEDURE — 96376 TX/PRO/DX INJ SAME DRUG ADON: CPT | Mod: 59

## 2023-09-13 PROCEDURE — 63600175 PHARM REV CODE 636 W HCPCS: Performed by: NURSE PRACTITIONER

## 2023-09-13 PROCEDURE — 81000 URINALYSIS NONAUTO W/SCOPE: CPT | Mod: 59 | Performed by: NURSE PRACTITIONER

## 2023-09-13 PROCEDURE — 93005 ELECTROCARDIOGRAM TRACING: CPT | Mod: 59

## 2023-09-13 PROCEDURE — 96375 TX/PRO/DX INJ NEW DRUG ADDON: CPT

## 2023-09-13 PROCEDURE — 85025 COMPLETE CBC W/AUTO DIFF WBC: CPT | Performed by: NURSE PRACTITIONER

## 2023-09-13 PROCEDURE — 88112 CYTOPATH CELL ENHANCE TECH: CPT | Mod: TC | Performed by: PATHOLOGY

## 2023-09-13 PROCEDURE — 51702 INSERT TEMP BLADDER CATH: CPT

## 2023-09-13 PROCEDURE — 53600 DILATE URETHRA STRICTURE: CPT | Mod: ,,, | Performed by: UROLOGY

## 2023-09-13 PROCEDURE — 81003 URINALYSIS AUTO W/O SCOPE: CPT | Performed by: NURSE PRACTITIONER

## 2023-09-13 PROCEDURE — 83735 ASSAY OF MAGNESIUM: CPT | Performed by: NURSE PRACTITIONER

## 2023-09-13 PROCEDURE — 80053 COMPREHEN METABOLIC PANEL: CPT | Performed by: NURSE PRACTITIONER

## 2023-09-13 PROCEDURE — 25000003 PHARM REV CODE 250: Performed by: NURSE PRACTITIONER

## 2023-09-13 PROCEDURE — 93010 ELECTROCARDIOGRAM REPORT: CPT | Mod: ,,, | Performed by: INTERNAL MEDICINE

## 2023-09-13 PROCEDURE — 96365 THER/PROPH/DIAG IV INF INIT: CPT

## 2023-09-13 PROCEDURE — 36415 COLL VENOUS BLD VENIPUNCTURE: CPT | Performed by: NURSE PRACTITIONER

## 2023-09-13 PROCEDURE — 99285 EMERGENCY DEPT VISIT HI MDM: CPT | Mod: 25

## 2023-09-13 PROCEDURE — 51798 US URINE CAPACITY MEASURE: CPT

## 2023-09-13 PROCEDURE — 99214 OFFICE O/P EST MOD 30 MIN: CPT | Mod: 25,,, | Performed by: UROLOGY

## 2023-09-13 PROCEDURE — 53600 PR DIL URETHRA STRIC,MALE,INITIAL: ICD-10-PCS | Mod: ,,, | Performed by: UROLOGY

## 2023-09-13 PROCEDURE — 93010 EKG 12-LEAD: ICD-10-PCS | Mod: ,,, | Performed by: INTERNAL MEDICINE

## 2023-09-13 PROCEDURE — 99214 PR OFFICE/OUTPT VISIT, EST, LEVL IV, 30-39 MIN: ICD-10-PCS | Mod: 25,,, | Performed by: UROLOGY

## 2023-09-13 PROCEDURE — 36415 COLL VENOUS BLD VENIPUNCTURE: CPT | Performed by: UROLOGY

## 2023-09-13 RX ORDER — ONDANSETRON 2 MG/ML
4 INJECTION INTRAMUSCULAR; INTRAVENOUS EVERY 8 HOURS PRN
Status: DISCONTINUED | OUTPATIENT
Start: 2023-09-13 | End: 2023-09-14 | Stop reason: HOSPADM

## 2023-09-13 RX ORDER — SODIUM,POTASSIUM PHOSPHATES 280-250MG
2 POWDER IN PACKET (EA) ORAL
Status: DISCONTINUED | OUTPATIENT
Start: 2023-09-13 | End: 2023-09-14 | Stop reason: HOSPADM

## 2023-09-13 RX ORDER — NALOXONE HCL 0.4 MG/ML
0.02 VIAL (ML) INJECTION
Status: DISCONTINUED | OUTPATIENT
Start: 2023-09-13 | End: 2023-09-14 | Stop reason: HOSPADM

## 2023-09-13 RX ORDER — LANOLIN ALCOHOL/MO/W.PET/CERES
800 CREAM (GRAM) TOPICAL
Status: DISCONTINUED | OUTPATIENT
Start: 2023-09-13 | End: 2023-09-14 | Stop reason: HOSPADM

## 2023-09-13 RX ORDER — TALC
9 POWDER (GRAM) TOPICAL NIGHTLY PRN
Status: DISCONTINUED | OUTPATIENT
Start: 2023-09-13 | End: 2023-09-14 | Stop reason: HOSPADM

## 2023-09-13 RX ORDER — ACETAMINOPHEN 325 MG/1
650 TABLET ORAL EVERY 6 HOURS PRN
Status: DISCONTINUED | OUTPATIENT
Start: 2023-09-13 | End: 2023-09-14 | Stop reason: HOSPADM

## 2023-09-13 RX ORDER — ACETAMINOPHEN 325 MG/1
650 TABLET ORAL EVERY 4 HOURS PRN
Status: DISCONTINUED | OUTPATIENT
Start: 2023-09-13 | End: 2023-09-14 | Stop reason: HOSPADM

## 2023-09-13 RX ORDER — POTASSIUM CHLORIDE 7.45 MG/ML
10 INJECTION INTRAVENOUS
Status: COMPLETED | OUTPATIENT
Start: 2023-09-13 | End: 2023-09-13

## 2023-09-13 RX ORDER — IBUPROFEN 200 MG
24 TABLET ORAL
Status: DISCONTINUED | OUTPATIENT
Start: 2023-09-13 | End: 2023-09-14 | Stop reason: HOSPADM

## 2023-09-13 RX ORDER — OXYCODONE HYDROCHLORIDE 5 MG/1
5 TABLET ORAL EVERY 6 HOURS PRN
Status: DISCONTINUED | OUTPATIENT
Start: 2023-09-13 | End: 2023-09-14 | Stop reason: HOSPADM

## 2023-09-13 RX ORDER — POTASSIUM CHLORIDE 20 MEQ/1
20 TABLET, EXTENDED RELEASE ORAL 2 TIMES DAILY
Qty: 6 TABLET | Refills: 0 | Status: SHIPPED | OUTPATIENT
Start: 2023-09-14 | End: 2023-09-14 | Stop reason: HOSPADM

## 2023-09-13 RX ORDER — POTASSIUM CHLORIDE 7.45 MG/ML
10 INJECTION INTRAVENOUS
Status: DISPENSED | OUTPATIENT
Start: 2023-09-13 | End: 2023-09-14

## 2023-09-13 RX ORDER — GLUCAGON 1 MG
1 KIT INJECTION
Status: DISCONTINUED | OUTPATIENT
Start: 2023-09-13 | End: 2023-09-14 | Stop reason: HOSPADM

## 2023-09-13 RX ORDER — ACETAMINOPHEN 500 MG
1000 TABLET ORAL
Status: COMPLETED | OUTPATIENT
Start: 2023-09-13 | End: 2023-09-13

## 2023-09-13 RX ORDER — SODIUM CHLORIDE 0.9 % (FLUSH) 0.9 %
10 SYRINGE (ML) INJECTION EVERY 8 HOURS PRN
Status: DISCONTINUED | OUTPATIENT
Start: 2023-09-13 | End: 2023-09-14 | Stop reason: HOSPADM

## 2023-09-13 RX ORDER — LIDOCAINE HYDROCHLORIDE 10 MG/ML
10 INJECTION INFILTRATION; PERINEURAL
Status: COMPLETED | OUTPATIENT
Start: 2023-09-13 | End: 2023-09-13

## 2023-09-13 RX ORDER — LIDOCAINE HYDROCHLORIDE 20 MG/ML
JELLY TOPICAL
Status: COMPLETED | OUTPATIENT
Start: 2023-09-13 | End: 2023-09-13

## 2023-09-13 RX ORDER — SODIUM CHLORIDE 9 MG/ML
INJECTION, SOLUTION INTRAVENOUS ONCE
Status: COMPLETED | OUTPATIENT
Start: 2023-09-13 | End: 2023-09-13

## 2023-09-13 RX ORDER — LORAZEPAM 2 MG/ML
1 INJECTION INTRAMUSCULAR
Status: COMPLETED | OUTPATIENT
Start: 2023-09-13 | End: 2023-09-13

## 2023-09-13 RX ORDER — SIMETHICONE 80 MG
1 TABLET,CHEWABLE ORAL 4 TIMES DAILY PRN
Status: DISCONTINUED | OUTPATIENT
Start: 2023-09-13 | End: 2023-09-14 | Stop reason: HOSPADM

## 2023-09-13 RX ORDER — LIDOCAINE 50 MG/G
1 PATCH TOPICAL DAILY
Qty: 10 PATCH | Refills: 0 | Status: SHIPPED | OUTPATIENT
Start: 2023-09-13

## 2023-09-13 RX ORDER — MORPHINE SULFATE 4 MG/ML
4 INJECTION, SOLUTION INTRAMUSCULAR; INTRAVENOUS
Status: COMPLETED | OUTPATIENT
Start: 2023-09-13 | End: 2023-09-13

## 2023-09-13 RX ORDER — INSULIN ASPART 100 [IU]/ML
1-10 INJECTION, SOLUTION INTRAVENOUS; SUBCUTANEOUS
Status: DISCONTINUED | OUTPATIENT
Start: 2023-09-13 | End: 2023-09-14 | Stop reason: HOSPADM

## 2023-09-13 RX ORDER — IBUPROFEN 200 MG
16 TABLET ORAL
Status: DISCONTINUED | OUTPATIENT
Start: 2023-09-13 | End: 2023-09-14 | Stop reason: HOSPADM

## 2023-09-13 RX ORDER — MAG HYDROX/ALUMINUM HYD/SIMETH 200-200-20
30 SUSPENSION, ORAL (FINAL DOSE FORM) ORAL 4 TIMES DAILY PRN
Status: DISCONTINUED | OUTPATIENT
Start: 2023-09-13 | End: 2023-09-14 | Stop reason: HOSPADM

## 2023-09-13 RX ADMIN — LIDOCAINE HYDROCHLORIDE 10 ML: 10 INJECTION, SOLUTION INFILTRATION; PERINEURAL at 07:09

## 2023-09-13 RX ADMIN — SODIUM CHLORIDE: 9 INJECTION, SOLUTION INTRAVENOUS at 11:09

## 2023-09-13 RX ADMIN — LIDOCAINE HYDROCHLORIDE 10 ML: 20 JELLY TOPICAL at 07:09

## 2023-09-13 RX ADMIN — LORAZEPAM 1 MG: 2 INJECTION INTRAMUSCULAR; INTRAVENOUS at 07:09

## 2023-09-13 RX ADMIN — POTASSIUM CHLORIDE 10 MEQ: 7.46 INJECTION, SOLUTION INTRAVENOUS at 10:09

## 2023-09-13 RX ADMIN — ACETAMINOPHEN 1000 MG: 500 TABLET ORAL at 03:09

## 2023-09-13 RX ADMIN — POTASSIUM CHLORIDE 10 MEQ: 7.46 INJECTION, SOLUTION INTRAVENOUS at 05:09

## 2023-09-13 RX ADMIN — MORPHINE SULFATE 2 MG: 4 INJECTION, SOLUTION INTRAMUSCULAR; INTRAVENOUS at 07:09

## 2023-09-13 RX ADMIN — LIDOCAINE HYDROCHLORIDE: 20 JELLY TOPICAL at 06:09

## 2023-09-13 RX ADMIN — POTASSIUM BICARBONATE 40 MEQ: 391 TABLET, EFFERVESCENT ORAL at 05:09
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
## 2023-09-13 NOTE — DISCHARGE INSTRUCTIONS
Resume previous home medications drinkplenty of water, stay hydrated, keep bladder flushed monitor dhillon out put if you stop putting out urine or if you have increased pain in bladder area go to nearest emergency room

## 2023-09-13 NOTE — ED NOTES
Attempted to pass coude catheter and was unable to insert. Met resistance and pt had pain. Notified Doctor.

## 2023-09-14 VITALS
BODY MASS INDEX: 30.89 KG/M2 | DIASTOLIC BLOOD PRESSURE: 91 MMHG | SYSTOLIC BLOOD PRESSURE: 158 MMHG | RESPIRATION RATE: 17 BRPM | HEIGHT: 65 IN | HEART RATE: 89 BPM | OXYGEN SATURATION: 96 % | WEIGHT: 185.44 LBS | TEMPERATURE: 98 F

## 2023-09-14 DIAGNOSIS — N35.812 OTHER STRICTURE OF BULBOUS URETHRA IN MALE: Primary | ICD-10-CM

## 2023-09-14 LAB
ALBUMIN SERPL BCP-MCNC: 3.5 G/DL (ref 3.5–5.2)
ALP SERPL-CCNC: 171 U/L (ref 55–135)
ALT SERPL W/O P-5'-P-CCNC: 15 U/L (ref 10–44)
ANION GAP SERPL CALC-SCNC: 11 MMOL/L (ref 8–16)
AST SERPL-CCNC: 24 U/L (ref 10–40)
BASOPHILS # BLD AUTO: 0.06 K/UL (ref 0–0.2)
BASOPHILS NFR BLD: 0.7 % (ref 0–1.9)
BILIRUB SERPL-MCNC: 1 MG/DL (ref 0.1–1)
BUN SERPL-MCNC: 6 MG/DL (ref 8–23)
CALCIUM SERPL-MCNC: 9.4 MG/DL (ref 8.7–10.5)
CHLORIDE SERPL-SCNC: 106 MMOL/L (ref 95–110)
CO2 SERPL-SCNC: 28 MMOL/L (ref 23–29)
CREAT SERPL-MCNC: 1.3 MG/DL (ref 0.5–1.4)
DIFFERENTIAL METHOD: ABNORMAL
EOSINOPHIL # BLD AUTO: 0.2 K/UL (ref 0–0.5)
EOSINOPHIL NFR BLD: 2.4 % (ref 0–8)
ERYTHROCYTE [DISTWIDTH] IN BLOOD BY AUTOMATED COUNT: 14.4 % (ref 11.5–14.5)
EST. GFR  (NO RACE VARIABLE): 59 ML/MIN/1.73 M^2
GLUCOSE SERPL-MCNC: 81 MG/DL (ref 70–110)
HCT VFR BLD AUTO: 39.7 % (ref 40–54)
HGB BLD-MCNC: 12.7 G/DL (ref 14–18)
IMM GRANULOCYTES # BLD AUTO: 0.02 K/UL (ref 0–0.04)
IMM GRANULOCYTES NFR BLD AUTO: 0.2 % (ref 0–0.5)
LYMPHOCYTES # BLD AUTO: 2.1 K/UL (ref 1–4.8)
LYMPHOCYTES NFR BLD: 25 % (ref 18–48)
MAGNESIUM SERPL-MCNC: 1.9 MG/DL (ref 1.6–2.6)
MCH RBC QN AUTO: 26.1 PG (ref 27–31)
MCHC RBC AUTO-ENTMCNC: 32 G/DL (ref 32–36)
MCV RBC AUTO: 82 FL (ref 82–98)
MONOCYTES # BLD AUTO: 0.6 K/UL (ref 0.3–1)
MONOCYTES NFR BLD: 7.3 % (ref 4–15)
NEUTROPHILS # BLD AUTO: 5.3 K/UL (ref 1.8–7.7)
NEUTROPHILS NFR BLD: 64.4 % (ref 38–73)
NRBC BLD-RTO: 0 /100 WBC
PHOSPHATE SERPL-MCNC: 3.2 MG/DL (ref 2.7–4.5)
PLATELET # BLD AUTO: 190 K/UL (ref 150–450)
PMV BLD AUTO: 10 FL (ref 9.2–12.9)
POCT GLUCOSE: 146 MG/DL (ref 70–110)
POTASSIUM SERPL-SCNC: 3.3 MMOL/L (ref 3.5–5.1)
PROT SERPL-MCNC: 7 G/DL (ref 6–8.4)
RBC # BLD AUTO: 4.86 M/UL (ref 4.6–6.2)
SODIUM SERPL-SCNC: 145 MMOL/L (ref 136–145)
WBC # BLD AUTO: 8.31 K/UL (ref 3.9–12.7)

## 2023-09-14 PROCEDURE — 36415 COLL VENOUS BLD VENIPUNCTURE: CPT | Performed by: NURSE PRACTITIONER

## 2023-09-14 PROCEDURE — 25000003 PHARM REV CODE 250: Performed by: NURSE PRACTITIONER

## 2023-09-14 PROCEDURE — 99900031 HC PATIENT EDUCATION (STAT)

## 2023-09-14 PROCEDURE — 94761 N-INVAS EAR/PLS OXIMETRY MLT: CPT

## 2023-09-14 PROCEDURE — 83735 ASSAY OF MAGNESIUM: CPT | Performed by: NURSE PRACTITIONER

## 2023-09-14 PROCEDURE — 80053 COMPREHEN METABOLIC PANEL: CPT | Performed by: NURSE PRACTITIONER

## 2023-09-14 PROCEDURE — 85025 COMPLETE CBC W/AUTO DIFF WBC: CPT | Performed by: NURSE PRACTITIONER

## 2023-09-14 PROCEDURE — G0378 HOSPITAL OBSERVATION PER HR: HCPCS

## 2023-09-14 PROCEDURE — 84100 ASSAY OF PHOSPHORUS: CPT | Performed by: NURSE PRACTITIONER

## 2023-09-14 PROCEDURE — 63600175 PHARM REV CODE 636 W HCPCS: Performed by: NURSE PRACTITIONER

## 2023-09-14 PROCEDURE — 96376 TX/PRO/DX INJ SAME DRUG ADON: CPT

## 2023-09-14 RX ADMIN — OXYCODONE HYDROCHLORIDE 5 MG: 5 TABLET ORAL at 12:09

## 2023-09-14 RX ADMIN — OXYCODONE HYDROCHLORIDE 5 MG: 5 TABLET ORAL at 05:09

## 2023-09-14 RX ADMIN — POTASSIUM BICARBONATE 35 MEQ: 391 TABLET, EFFERVESCENT ORAL at 05:09

## 2023-09-14 RX ADMIN — POTASSIUM CHLORIDE 10 MEQ: 7.46 INJECTION, SOLUTION INTRAVENOUS at 12:09

## 2023-09-14 RX ADMIN — POTASSIUM BICARBONATE 35 MEQ: 391 TABLET, EFFERVESCENT ORAL at 10:09

## 2023-09-14 NOTE — NURSING
Patient complaining of potassium burning. Per Nyla Schaeffer, NP- hold IV replacement and check am labs then replace with po if needed.

## 2023-09-14 NOTE — PLAN OF CARE
[facility] - Med/Surg  Initial Discharge Assessment       Primary Care Provider: Cirilo Thomas MD    Admission Diagnosis: Urinary retention [R33.9]    Admission Date: 9/13/2023  Expected Discharge Date:     Transition of Care Barriers: None    Payor: MEDICARE / Plan: MEDICARE PART A & B / Product Type: Government /     No emergency contact information on file.    Discharge Plan A: Home  Discharge Plan B: Home with family      [id number] - [place] - [address]  [address] [place] [address]  Phone: [phone] Fax: [phone]    SW met with patient at bedside to complete discharge planning assessment.  Patient alert and oriented xs 4.  Patient verified all demographic information on facesheet is correct.  Patient verified PCP is Dr. Thomas.  Patient verified primary health insurance is Medicare and secondary [place] Medicaid.  Patient with NO home health or DME.  Patient with NO POA or Living Will.  Patient not on dialysis or medication coumadin.  Patient with no 30 day admission.  Patient with no financial issues at this time.  Patient will provide transportation upon discharge from facility.  Patient independent with ADLs, live alone, drives self.      Initial Assessment (most recent)       Adult Discharge Assessment - 09/14/23 0916          Discharge Assessment    Assessment Type Discharge Planning Assessment     Confirmed/corrected address, phone number and insurance Yes     Confirmed Demographics Correct on Facesheet     Source of Information patient     Does patient/caregiver understand observation status Yes     Communicated ANUM with patient/caregiver Yes     People in Home alone     Facility Arrived From: home     Do you expect to return to your current living situation? Yes     Do you have help at home or someone to help you manage your care at home? Yes     Who are your caregiver(s) and their phone number(s)? self     Prior to hospitilization cognitive status:  Alert/Oriented     Current cognitive status: Alert/Oriented     Equipment Currently Used at Home none     Readmission within 30 days? No     Patient currently being followed by outpatient case management? No     Do you currently have service(s) that help you manage your care at home? No     Do you take prescription medications? Yes     Do you have prescription coverage? Yes     Do you have any problems affording any of your prescribed medications? No     Is the patient taking medications as prescribed? yes     Who is going to help you get home at discharge? self     How do you get to doctors appointments? car, drives self     Are you on dialysis? No     Do you take coumadin? No     DME Needed Upon Discharge  none     Discharge Plan discussed with: Patient     Transition of Care Barriers None     Discharge Plan A Home     Discharge Plan B Home with family

## 2023-09-14 NOTE — PLAN OF CARE
Plan of care reviewed with patient. Verbalized understanding. IV intact and patent with fluids infusing. Frias in place and draining/. Tele in place and being monitored. Patient alert and able to make needs known. No complaints of pain or nausea noted. Safety maintained. Call light in reach and instructed to call for assistance. Will continue to monitor.

## 2023-09-14 NOTE — PLAN OF CARE
09/14/23 0925   SHEPPARD Message   Medicare Outpatient and Observation Notification regarding financial responsibility Explained to patient/caregiver;Signed/date by patient/caregiver   Date SHEPPARD was signed 09/14/23   Time SHEPPARD was signed 0925
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
## 2023-09-14 NOTE — PLAN OF CARE
Plan of care reviewed Verbalized understanding. VSS afebrile pain managed with PRN medications Frias in place and draining clear red urine with clots noted NP aware Tele in place and being monitored. Patient AAOX4  Safety maintained. Call light in reach and instructed to call for assistance. Will continue to monitor.

## 2023-09-14 NOTE — PLAN OF CARE
Referral sent to [facility]  for review via [facility].      09/14/23 5768   Post-Acute Status   Post-Acute Authorization Home Health   Home Health Status Referrals Sent   Patient choice form signed by patient/caregiver List from System Post-Acute Care

## 2023-09-14 NOTE — NURSING
Brought leg bag to room changed  bag to leg bag with Patient teaching performed verbalizes understanding at this time New  bag given for home use

## 2023-09-14 NOTE — HPI
Carson Griffith is a 72-year-old male who presents emergency room complaining of left flank pain.  He reports pain onset at 10:00 a.m. when he woke up.  He denies any injury or illness.  He denies fever chills.  No aggravating alleviating factors.  He describes the pain as knife-like sensation in the left flank region.  No aggravating alleviating factors.  Previous medical history includes BPH, diabetes, and colon resection.  ER workup:  CBC unremarkable.  CMP with hypokalemia of 2.8.  Alk-phos mildly elevated 149.  Urinalysis negative.  Magnesium 1.9.  CT the abdomen and pelvis demonstrates marked bladder distention with severe bilateral hydronephrosis consistent with bladder outlet syndrome.  Emergency room had difficulty placing Frias catheter.  Dr. Mitchell was consulted and evaluated patient in ER where she placed Frias catheter.  Patient admitted Hospital Medicine for treatment and management.
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
## 2023-09-14 NOTE — PLAN OF CARE
Hospital follow up scheduled for 9/20/23 @ 10:00. Added to AVS.     09/14/23 1426   Post-Acute Status   Hospital Resources/Appts/Education Provided Appointments scheduled and added to AVS

## 2023-09-14 NOTE — TELEPHONE ENCOUNTER
----- Message from Karen Mitchell MD sent at 9/13/2023  9:15 PM CDT -----    Fill and pull in and pm return in 2 weeks for pvr and send urine that day for ua and culture.   SEND Cytology as well if  not already done in ER today - make sure to check this    Schedule kidney kidney ultrasound to be done prior to dhillon removal    Expect residual to be > 350/400, so go ahead and plan to teach CIC every 6 hours that same afternoon    Will schedule him for cysto on 10/16    Will use urine from his fill and pull day to treat for uti.

## 2023-09-14 NOTE — TELEPHONE ENCOUNTER
Fill and pull in and pm return in 2 weeks for pvr and send urine that day for ua and culture.     SEND Cytology that day as well if  not already done in ER today - make sure to check this    Schedule kidney kidney ultrasound to be done prior to dhillon removal    Expect residual to be > 350/400, so go ahead and plan to teach CIC every 6 hours that same afternoon    Will schedule him for cysto on 10/16    Will use urine from his fill and pull day to treat for uti.

## 2023-09-14 NOTE — PROGRESS NOTES
Procedure Order to Urology [[id number]]    Electronically signed by: Karen Mitchell MD on 09/13/23 2120 Status: Active   Ordering user: Karen Mitchell MD 09/13/23 2120 Authorized by: Karen Mitchell MD   Ordering mode: Standard   Frequency:  09/13/23 -     Diagnoses   Urinary retention [R33.9]   Hydronephrosis, bilateral [N13.30]   Questionnaire    Question Answer   Procedure Cystoscopy Comment - 10/16   Is patient on anti-coagulants? No   Pre-op Diagnosis Gross hematuria   Facility Name: [facility], Please order Urine POCT without micro . Local sedation (no urine Dr Franklin or Dr oconnor)

## 2023-09-14 NOTE — PHARMACY MED REC
"Admission Medication History     The home medication history was taken by Bayron Contreras.    You may go to "Admission" then "Reconcile Home Medications" tabs to review and/or act upon these items.     The home medication list has been updated by the Pharmacy department.   Please read ALL comments highlighted in yellow.   Please address this information as you see fit.    Feel free to contact us if you have any questions or require assistance.      The medications listed below were removed from the home medication list. Please reorder if appropriate:  Patient reports no longer taking the following medication(s):  Lidex Solution      Medications listed below were obtained from: Patient/family and Analytic software- [employer]  No current facility-administered medications on file prior to encounter.     Current Outpatient Medications on File Prior to Encounter   Medication Sig Dispense Refill    apremilast (OTEZLA) 30 mg Tab Take 1 tablet by mouth twice a day (Patient taking differently: Take 1 tablet by mouth Daily. Take 1 tablet by mouth twice a day) 60 tablet 11    atorvastatin (LIPITOR) 10 MG tablet Take 10 mg by mouth once daily.      citalopram (CELEXA) 20 MG tablet Take 20 mg by mouth once daily.      icosapent ethyL (VASCEPA) 1 gram Cap Take 2 capsules by mouth 2 (two) times daily.      JARDIANCE 25 mg tablet Take 25 mg by mouth once daily.      LANTUS SOLOSTAR U-100 INSULIN glargine 100 units/mL (3mL) SubQ pen Inject 65 Units into the skin every evening.      levothyroxine (SYNTHROID) 88 MCG tablet Take 88 mcg by mouth every evening.      losartan (COZAAR) 50 MG tablet Take 50 mg by mouth once daily.      multivitamin capsule Take 1 capsule by mouth once daily.      TRULICITY 0.75 mg/0.5 mL pen injector Inject 0.75 mg into the skin every 7 days. Fridays      vit C-vit E-lutein-min-om-3 658-67-7-150 mg-unit-mg-mg Cap Take 1 capsule by mouth once daily.      ALCOHOL PADS PadM TEST 2 TIMES DAILY      augmented " "betamethasone dipropionate (DIPROLENE-AF) 0.05 % cream APPLY TO THE AFFECTED AREA TWICE FOR 2-3 WEEKS THEN TAKE 1 WEEK OFF 50 g 2    BD ULTRA-FINE SHANI PEN NEEDLE 32 gauge x 5/32" Ndle Inject 1 each into the skin 3 (three) times daily.      BYDUREON BCISE 2 mg/0.85 mL AtIn       COMFORT EZ INSULIN SYRINGE 1 mL 31 gauge x 5/16 Syrg INJECT SUBCUTANEOUSLY 2 TIMES DAILY      COMFORT EZ PEN NEEDLES 33 gauge x 5/32" Ndle INJECT SUBCUTANEOUSLY 2 TIMES DAILY      oxyCODONE-acetaminophen (PERCOCET) 5-325 mg per tablet Take 1 tablet by mouth every 4 (four) hours as needed for Pain. 12 tablet 0    [DISCONTINUED] FENUGREEK SEED-BL.THISTLE-ANIS ORAL Take by mouth.      [DISCONTINUED] fluocinonide (LIDEX) 0.05 % external solution APPLY TO SCALP 1 2 TIMES A DAY             Bayron Contreras  EXT 1924                 .          "

## 2023-09-14 NOTE — ED NOTES
Pt requested to only take 2 mg of Morphine due to being sensitive to pain meds.   2mg of Morphine given, 2mg morphine wasted with charge nurse Sugar Ruth RN.
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
## 2023-09-14 NOTE — PROGRESS NOTES
Wound care consult completed. RLE wound cleansed with wound cleanser. Dried completely. Skin barrier prep applied to surrounding skin. Urgotel mesh applied to wound bed, then was covered with Mepelix foam dressing. Patient tolerated well. Patient updated on plan of care regarding wound care. Questions encouraged and answered. Patient verbalized understanding. Care in progress.         09/14/23 0734        Altered Skin Integrity 09/13/23 0235 Right anterior;distal;lower Leg Partial thickness tissue loss. Shallow open ulcer with a red or pink wound bed, without slough. Intact or Open/Ruptured Serum-filled blister.   Date First Assessed/Time First Assessed: 09/13/23 0235   Altered Skin Integrity Present on Admission - Did Patient arrive to the hospital with altered skin?: yes  Side: Right  Orientation: anterior;distal;lower  Location: Leg  Description of Altered S...   Wound Image    Description of Altered Skin Integrity Partial thickness tissue loss. Shallow open ulcer with a red or pink wound bed, without slough. Intact or Open/Ruptured Serum-filled blister.   Dressing Appearance Open to air   Drainage Amount None   Appearance Red;Dry   Periwound Area Intact   Wound Edges Irregular   Care Cleansed with:;Wound cleanser   Dressing Applied;Silver;Foam   Dressing Change Due 09/15/23

## 2023-09-14 NOTE — PLAN OF CARE
Pt clear for DC from CM standpoint. Discharging home with .     Pt accepted by [facility], SOC will be tomorrow 9/15/23.     09/14/23 3328   Final Note   Assessment Type Final Discharge Note   Anticipated Discharge Disposition Home-Health   Post-Acute Status   Post-Acute Authorization Home Health   Home Health Status Set-up Complete/Auth obtained

## 2023-09-14 NOTE — CARE UPDATE
09/14/23 0734   Patient Assessment/Suction   Level of Consciousness (AVPU) alert   Respiratory Effort Normal;Unlabored   Expansion/Accessory Muscles/Retractions no use of accessory muscles;no retractions;expansion symmetric   Rhythm/Pattern, Respiratory unlabored;pattern regular;depth regular;no shortness of breath reported   Cough Frequency no cough   PRE-TX-O2   Device (Oxygen Therapy) room air   SpO2 95 %   Pulse Oximetry Type Intermittent   $ Pulse Oximetry - Multiple Charge Pulse Oximetry - Multiple   Pulse 77   Resp 18   Positioning HOB elevated 30 degrees   Education   $ Education 15 min

## 2023-09-14 NOTE — NURSING
Arrived to floor via stretcher. Ambulated to bed without difficulty. Frias in place and draining. Small amount of bloody drainage noted around meatus. IV intact and patent with fluids infusing. Oriented to call system.       Nurses Note -- 4 Eyes      9/14/2023   12:14 AM      Skin assessed during: Admit      [] No Altered Skin Integrity Present    []Prevention Measures Documented    [x]   Yes- Altered Skin Integrity Present or Discovered   [x] LDA Added if Not in Epic (Describe Wound)   [x] New Altered Skin Integrity was Present on Admit and Documented in LDA   [x] Wound Image Taken    Wound Care Consulted? Yes    Attending Nurse:  Monae Schultz LPN    Second RN/Staff Member:  Sharonda Rodriguez RN

## 2023-09-16 NOTE — HOSPITAL COURSE
Patient was monitored closely during hospital stay.  He required urgent urology consult in the ED for Frias placement.  Patient underwent Urethral dilation and placement of Frias catheter by Dr. Mitchell on 09/14.  UA obtained and negative for bacteria.  Patient felt better and stated he wanted to go home.  Repeat renal ultrasound was performed and showed persistent bilateral hydronephrosis.  This was discussed with patient and he was encouraged to stay additional night to further monitor urine output and patient adamantly declined stating he was going home.  Patient was instructed on close outpatient follow-up for further diagnostic workup outpatient.  Patient verbalized understanding discharge instructions and return precautions.    PE:  Awake alert oriented x3, pleasant, no acute distress   Heart-regular rate rhythm, no edema  Lungs-Clear to auscultation bilaterally, respirations even unlabored   Abdomen-soft nontender normoactive bowel sounds   -Frias in place draining pink tinged urine

## 2023-09-16 NOTE — DISCHARGE SUMMARY
[facility] Medicine  Discharge Summary      Patient Name: Carson Griffith  MRN: [id number]  STEVAN: [id number]  Patient Class: OP- Observation  Admission Date: 9/13/2023  Hospital Length of Stay: 0 days  Discharge Date and Time: 9/14/2023  3:02 PM  Attending Physician: No att. providers found   Discharging Provider: Karen Hanson NP  Primary Care Provider: Cirilo Thomas MD    Primary Care Team: Networked reference to record PCT     HPI:   Carson Griffith is a 72-year-old male who presents emergency room complaining of left flank pain.  He reports pain onset at 10:00 a.m. when he woke up.  He denies any injury or illness.  He denies fever chills.  No aggravating alleviating factors.  He describes the pain as knife-like sensation in the left flank region.  No aggravating alleviating factors.  Previous medical history includes BPH, diabetes, and colon resection.  ER workup:  CBC unremarkable.  CMP with hypokalemia of 2.8.  Alk-phos mildly elevated 149.  Urinalysis negative.  Magnesium 1.9.  CT the abdomen and pelvis demonstrates marked bladder distention with severe bilateral hydronephrosis consistent with bladder outlet syndrome.  Emergency room had difficulty placing Frias catheter.  Dr. Mitchell was consulted and evaluated patient in ER where she placed Frias catheter.  Patient admitted Hospital Medicine for treatment and management.      * No surgery found *      Hospital Course:   Patient was monitored closely during hospital stay.  He required urgent urology consult in the ED for Frias placement.  Patient underwent Urethral dilation and placement of Frias catheter by Dr. Mitchell on 09/14.  UA obtained and negative for bacteria.  Patient felt better and stated he wanted to go home.  Repeat renal ultrasound was performed and showed persistent bilateral hydronephrosis.  This was discussed with patient and he was encouraged to stay additional night to further monitor urine  output and patient adamantly declined stating he was going home.  Patient was instructed on close outpatient follow-up for further diagnostic workup outpatient.  Patient verbalized understanding discharge instructions and return precautions.    PE:  Awake alert oriented x3, pleasant, no acute distress   Heart-regular rate rhythm, no edema  Lungs-Clear to auscultation bilaterally, respirations even unlabored   Abdomen-soft nontender normoactive bowel sounds   -Frias in place draining pink tinged urine       Goals of Care Treatment Preferences:  Code Status: Full Code      Consults:     No new Assessment & Plan notes have been filed under this hospital service since the last note was generated.  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    Specialty: Urology  Why: hospital follow up, to recheck your symptoms  Contact information:  [address] [place] [address] [address]IVE  [address]  [facility] [address]  [phone]             Cirilo Thomas MD. Go on 9/20/2023.    Specialty: Internal Medicine  Why: hospital follow up at 10:00  Contact information:  [address] [place] [address]    [address]  [facility] [address]  [phone]             [facility] Of Follow up.    Specialty: Home Health Services  Contact information:  [address] [facility]  [address]  [place] [address] [phone]                       Patient Instructions:      Magnesium   Standing Status: Future Standing Exp. Date: 11/12/24     Basic metabolic panel   Standing Status: Future Standing Exp. Date: 11/12/24      Ambulatory referral/consult to Home Health   Standing Status: Future   Referral Priority: Routine Referral Type: Home Health Care   Referral Reason: Specialty Services Required   Requested Specialty: Home Health Services   Number of Visits Requested: 1     Diet Cardiac     Diet diabetic     Notify your health care provider if you experience any of the following:  temperature >100.4     Notify your health care provider if you experience any of the following:  persistent nausea and vomiting or diarrhea     Notify your health care provider if you experience any of the following:  severe uncontrolled pain     Notify your health care provider if you experience any of the following:  difficulty breathing or increased cough     Notify your health care provider if you experience any of the following:  persistent dizziness, light-headedness, or visual disturbances     Notify your health care provider if you experience any of the following:  increased confusion or weakness     Activity as tolerated       Significant Diagnostic Studies: Labs: All labs within the past 24 hours have been reviewed    Pending Diagnostic Studies:     None         Medications:  Reconciled Home Medications:      Medication List      START taking these medications    LIDOcaine 5 %  Commonly known as: LIDODERM  Place 1 patch onto the skin once daily. Remove & Discard patch within 12 hours or as directed by MD        CHANGE how you take these medications    OTEZLA 30 mg Tab  Generic drug: apremilast  Take 1 tablet by mouth twice a day  What changed:   · how much to take  · how to take this  · when to take this        CONTINUE taking these medications    ALCOHOL PADS Padm  Generic drug: alcohol swabs  TEST 2 TIMES DAILY     atorvastatin 10 MG tablet  Commonly known as: LIPITOR  Take 10 mg by mouth once daily.     citalopram 20 MG tablet  Commonly known as: CeleXA  Take 20 mg by mouth once daily.     COMFORT EZ INSULIN SYRINGE 1 mL 31 gauge x 5/16  "Syrg  Generic drug: insulin syringe-needle U-100  INJECT SUBCUTANEOUSLY 2 TIMES DAILY     * COMFORT EZ PEN NEEDLES 33 gauge x 5/32" Ndle  Generic drug: pen needle, diabetic  INJECT SUBCUTANEOUSLY 2 TIMES DAILY     * BD ULTRA-FINE SHANI PEN NEEDLE 32 gauge x 5/32" Ndle  Generic drug: pen needle, diabetic  Inject 1 each into the skin 3 (three) times daily.     icosapent ethyL 1 gram Cap  Commonly known as: VASCEPA  Take 2 capsules by mouth 2 (two) times daily.     JARDIANCE 25 mg tablet  Generic drug: empagliflozin  Take 25 mg by mouth once daily.     LANTUS SOLOSTAR U-100 INSULIN glargine 100 units/mL SubQ pen  Generic drug: insulin  Inject 65 Units into the skin every evening.     levothyroxine 88 MCG tablet  Commonly known as: SYNTHROID  Take 88 mcg by mouth every evening.     losartan 50 MG tablet  Commonly known as: COZAAR  Take 50 mg by mouth once daily.     multivitamin capsule  Take 1 capsule by mouth once daily.     TRULICITY 0.75 mg/0.5 mL pen injector  Generic drug: dulaglutide  Inject 0.75 mg into the skin every 7 days. Fridays     vit C-vit E-lutein-min-om-3 [id number] mg-unit-mg-mg Cap  Take 1 capsule by mouth once daily.         * This list has 2 medication(s) that are the same as other medications prescribed for you. Read the directions carefully, and ask your doctor or other care provider to review them with you.            STOP taking these medications    augmented betamethasone dipropionate 0.05 % cream  Commonly known as: DIPROLENE-AF     BYDUREON BCISE 2 mg/0.85 mL Atin  Generic drug: exenatide microspheres     oxyCODONE-acetaminophen 5-325 mg per tablet  Commonly known as: PERCOCET            Indwelling Lines/Drains at time of discharge:   Lines/Drains/Airways     None                 Time spent on the discharge of patient: 45 minutes         Karen Hanson NP  Department of Hospital Medicine  [facility]/Surg  "

## 2023-09-18 NOTE — PROGRESS NOTES
Please let pt know- We sent your urine to look for bladder cancer cells. This is called a urine cytology. No obvious bladder cancer cells were seen under the microscope. However this test is not very accurate and if a follow up was made (cystoscopy, clinic follow-up) please make sure to keep this.

## 2023-09-22 ENCOUNTER — HOSPITAL ENCOUNTER (EMERGENCY)
Facility: HOSPITAL | Age: 73
Discharge: HOME | End: 2023-09-22
Attending: EMERGENCY MEDICINE
Payer: MEDICARE

## 2023-09-22 VITALS
HEART RATE: 77 BPM | HEIGHT: 65 IN | BODY MASS INDEX: 30.82 KG/M2 | DIASTOLIC BLOOD PRESSURE: 79 MMHG | RESPIRATION RATE: 18 BRPM | WEIGHT: 185 LBS | TEMPERATURE: 97 F | SYSTOLIC BLOOD PRESSURE: 153 MMHG | OXYGEN SATURATION: 96 %

## 2023-09-22 DIAGNOSIS — T83.9XXA PROBLEM WITH FOLEY CATHETER, INITIAL ENCOUNTER: Primary | ICD-10-CM

## 2023-09-22 PROCEDURE — 99282 EMERGENCY DEPT VISIT SF MDM: CPT

## 2023-09-22 PROCEDURE — 99281 EMR DPT VST MAYX REQ PHY/QHP: CPT
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
## 2023-09-27 ENCOUNTER — CLINICAL SUPPORT (OUTPATIENT)
Dept: UROLOGY | Facility: CLINIC | Age: 73
End: 2023-09-27
Payer: MEDICARE

## 2023-09-27 DIAGNOSIS — R33.9 URINARY RETENTION: Primary | ICD-10-CM

## 2023-09-27 LAB — POC RESIDUAL URINE VOLUME: 339 ML (ref 0–100)

## 2023-09-27 PROCEDURE — 99999PBSHW POCT BLADDER SCAN: ICD-10-PCS | Mod: PBBFAC,,,

## 2023-09-27 PROCEDURE — 51798 US URINE CAPACITY MEASURE: CPT | Mod: PBBFAC,PO

## 2023-09-27 PROCEDURE — 99499 NO LOS: ICD-10-PCS | Mod: S$PBB,,, | Performed by: UROLOGY

## 2023-09-27 PROCEDURE — 99999PBSHW POCT BLADDER SCAN: Mod: PBBFAC,,,

## 2023-09-27 PROCEDURE — 99499 UNLISTED E&M SERVICE: CPT | Mod: S$PBB,,, | Performed by: UROLOGY

## 2023-09-27 NOTE — PROGRESS NOTES
Patient arrived in office today for a fill and pull.  Patient states he removed catheter him self on 9/22 due to being bothersome.  Patient ambulated to restroom successfully emptied bladder.  PVR by bladder scan 339  Patient declined learning CIC if not needed.  Patient will return on 10/4 for urine sample.  Patient left office in satisfactory condition

## 2023-10-04 ENCOUNTER — CLINICAL SUPPORT (OUTPATIENT)
Dept: UROLOGY | Facility: CLINIC | Age: 73
End: 2023-10-04
Payer: MEDICARE

## 2023-10-04 DIAGNOSIS — N13.30 HYDRONEPHROSIS, BILATERAL: ICD-10-CM

## 2023-10-04 DIAGNOSIS — R33.9 URINARY RETENTION: Primary | ICD-10-CM

## 2023-10-04 DIAGNOSIS — R82.998 CELLS AND CASTS IN URINE: Primary | ICD-10-CM

## 2023-10-04 LAB
BACTERIA #/AREA URNS AUTO: ABNORMAL /HPF
BILIRUB UR QL STRIP: NEGATIVE
CLARITY UR REFRACT.AUTO: ABNORMAL
COLOR UR AUTO: YELLOW
GLUCOSE UR QL STRIP: ABNORMAL
HGB UR QL STRIP: ABNORMAL
HYALINE CASTS UR QL AUTO: 0 /LPF
KETONES UR QL STRIP: NEGATIVE
LEUKOCYTE ESTERASE UR QL STRIP: ABNORMAL
MICROSCOPIC COMMENT: ABNORMAL
NITRITE UR QL STRIP: NEGATIVE
PH UR STRIP: 6 [PH] (ref 5–8)
PROT UR QL STRIP: ABNORMAL
RBC #/AREA URNS AUTO: 17 /HPF (ref 0–4)
SP GR UR STRIP: 1.01 (ref 1–1.03)
URN SPEC COLLECT METH UR: ABNORMAL
WBC #/AREA URNS AUTO: >100 /HPF (ref 0–5)
WBC CLUMPS UR QL AUTO: ABNORMAL
YEAST UR QL AUTO: ABNORMAL

## 2023-10-04 PROCEDURE — 99499 UNLISTED E&M SERVICE: CPT | Mod: ,,, | Performed by: UROLOGY

## 2023-10-04 PROCEDURE — 87106 FUNGI IDENTIFICATION YEAST: CPT | Performed by: UROLOGY

## 2023-10-04 PROCEDURE — 87086 URINE CULTURE/COLONY COUNT: CPT | Performed by: UROLOGY

## 2023-10-04 PROCEDURE — 81001 URINALYSIS AUTO W/SCOPE: CPT | Performed by: UROLOGY

## 2023-10-04 PROCEDURE — 87088 URINE BACTERIA CULTURE: CPT | Performed by: UROLOGY

## 2023-10-04 PROCEDURE — 99499 NO LOS: ICD-10-PCS | Mod: ,,, | Performed by: UROLOGY

## 2023-10-04 PROCEDURE — 88112 PR  CYTOPATH, CELL ENHANCE TECH: ICD-10-PCS | Mod: 26,,, | Performed by: STUDENT IN AN ORGANIZED HEALTH CARE EDUCATION/TRAINING PROGRAM

## 2023-10-04 PROCEDURE — 88112 CYTOPATH CELL ENHANCE TECH: CPT | Mod: 26,,, | Performed by: STUDENT IN AN ORGANIZED HEALTH CARE EDUCATION/TRAINING PROGRAM

## 2023-10-04 PROCEDURE — 88112 CYTOPATH CELL ENHANCE TECH: CPT | Performed by: STUDENT IN AN ORGANIZED HEALTH CARE EDUCATION/TRAINING PROGRAM

## 2023-10-04 NOTE — PROGRESS NOTES
Per  patient arrived in office today provided a clean catch urine sample.   Urine sent for u/a, urine culture and urine cytology.  Patient left office in satisfactory condition.

## 2023-10-05 LAB
BACTERIA UR CULT: ABNORMAL
FINAL PATHOLOGIC DIAGNOSIS: NORMAL
Lab: NORMAL
MICROSCOPIC EXAM: NORMAL

## 2023-10-13 ENCOUNTER — HOSPITAL ENCOUNTER (OUTPATIENT)
Dept: RADIOLOGY | Facility: HOSPITAL | Age: 73
Discharge: HOME | End: 2023-10-13
Attending: UROLOGY
Payer: MEDICARE

## 2023-10-13 DIAGNOSIS — N13.30 HYDRONEPHROSIS, BILATERAL: ICD-10-CM

## 2023-10-13 DIAGNOSIS — R33.9 URINARY RETENTION: ICD-10-CM

## 2023-10-13 PROCEDURE — 76770 US RETROPERITONEAL COMPLETE: ICD-10-PCS | Mod: 26,,, | Performed by: RADIOLOGY

## 2023-10-13 PROCEDURE — 76770 US EXAM ABDO BACK WALL COMP: CPT | Mod: 26,,, | Performed by: RADIOLOGY

## 2023-10-13 PROCEDURE — 76770 US EXAM ABDO BACK WALL COMP: CPT | Mod: TC

## 2023-10-16 ENCOUNTER — TELEPHONE (OUTPATIENT)
Dept: UROLOGY | Facility: CLINIC | Age: 73
End: 2023-10-16
Payer: MEDICARE

## 2023-10-16 NOTE — TELEPHONE ENCOUNTER
Pt was no show for cysto today  Urien culture with yeast  Rbus shows sig b hydro with full bladder  Lab Results   Component Value Date    CREATININE 1.4 10/13/2023     Needs to fu at minimum in clinic with pvr and to learn cic  Also needs repawt rbus with dhillon in place for at least 1 week to see If hydro resolves  If able schedule him for fu with np to do above  May need stents if kd not draning

## 2023-10-19 NOTE — TELEPHONE ENCOUNTER
Phoned patient no answer left message message on voicemail to give the office a call back  Unable to reach patient

## 2024-03-18 ENCOUNTER — OFFICE VISIT (OUTPATIENT)
Dept: UROLOGY | Facility: CLINIC | Age: 74
End: 2024-03-18
Payer: MEDICARE

## 2024-03-18 VITALS — HEIGHT: 65 IN | BODY MASS INDEX: 30.81 KG/M2 | WEIGHT: 184.94 LBS

## 2024-03-18 DIAGNOSIS — R30.0 DYSURIA: Primary | ICD-10-CM

## 2024-03-18 DIAGNOSIS — R35.0 URINARY FREQUENCY: ICD-10-CM

## 2024-03-18 LAB
BACTERIA #/AREA URNS AUTO: ABNORMAL /HPF
BILIRUBIN, UA POC OHS: NEGATIVE
BLOOD, UA POC OHS: ABNORMAL
CLARITY, UA POC OHS: CLEAR
COLOR, UA POC OHS: YELLOW
GLUCOSE, UA POC OHS: >=1000
KETONES, UA POC OHS: NEGATIVE
LEUKOCYTES, UA POC OHS: ABNORMAL
MICROSCOPIC COMMENT: ABNORMAL
NITRITE, UA POC OHS: NEGATIVE
PH, UA POC OHS: 6
POC RESIDUAL URINE VOLUME: 212 ML (ref 0–100)
PROTEIN, UA POC OHS: 100
RBC #/AREA URNS AUTO: 83 /HPF (ref 0–4)
SPECIFIC GRAVITY, UA POC OHS: 1.01
UROBILINOGEN, UA POC OHS: 0.2
WBC #/AREA URNS AUTO: >100 /HPF (ref 0–5)
WBC CLUMPS UR QL AUTO: ABNORMAL

## 2024-03-18 PROCEDURE — 81003 URINALYSIS AUTO W/O SCOPE: CPT | Mod: 59,PBBFAC,PO | Performed by: NURSE PRACTITIONER

## 2024-03-18 PROCEDURE — 51798 US URINE CAPACITY MEASURE: CPT | Mod: PBBFAC,PO | Performed by: NURSE PRACTITIONER

## 2024-03-18 PROCEDURE — 87086 URINE CULTURE/COLONY COUNT: CPT | Performed by: NURSE PRACTITIONER

## 2024-03-18 PROCEDURE — 99214 OFFICE O/P EST MOD 30 MIN: CPT | Mod: PBBFAC,PO | Performed by: NURSE PRACTITIONER

## 2024-03-18 PROCEDURE — 87088 URINE BACTERIA CULTURE: CPT | Performed by: NURSE PRACTITIONER

## 2024-03-18 PROCEDURE — 99215 OFFICE O/P EST HI 40 MIN: CPT | Mod: S$PBB,,, | Performed by: NURSE PRACTITIONER

## 2024-03-18 PROCEDURE — 99999PBSHW POCT URINALYSIS(INSTRUMENT): Mod: PBBFAC,,,

## 2024-03-18 PROCEDURE — 87106 FUNGI IDENTIFICATION YEAST: CPT | Performed by: NURSE PRACTITIONER

## 2024-03-18 PROCEDURE — 99999 PR PBB SHADOW E&M-EST. PATIENT-LVL IV: CPT | Mod: PBBFAC,,, | Performed by: NURSE PRACTITIONER

## 2024-03-18 PROCEDURE — 81001 URINALYSIS AUTO W/SCOPE: CPT | Performed by: NURSE PRACTITIONER

## 2024-03-18 PROCEDURE — 99999PBSHW POCT BLADDER SCAN: Mod: PBBFAC,,,

## 2024-03-18 RX ORDER — AMOXICILLIN AND CLAVULANATE POTASSIUM 500; 125 MG/1; MG/1
1 TABLET, FILM COATED ORAL 2 TIMES DAILY
Qty: 14 TABLET | Refills: 0 | Status: SHIPPED | OUTPATIENT
Start: 2024-03-18 | End: 2024-03-21

## 2024-03-18 RX ORDER — DAPAGLIFLOZIN 10 MG/1
10 TABLET, FILM COATED ORAL
COMMUNITY
Start: 2024-02-28
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
## 2024-03-18 NOTE — ASSESSMENT & PLAN NOTE
"Patient's FSGs are controlled on current medication regimen.  Last A1c reviewed- No results found for: "LABA1C", "HGBA1C"  Most recent fingerstick glucose reviewed- No results for input(s): "POCTGLUCOSE" in the last 24 hours.  Current correctional scale  Medium  Maintain anti-hyperglycemic dose as follows-   Antihyperglycemics (From admission, onward)    None        Hold Oral hypoglycemics while patient is in the hospital.  "
Acute  CT:Impression:     Marked bladder distension with bilateral severe hydronephrosis.  This is consistent with bladder outlet obstruction although the prostate is not presently enlarged status post TURP.  Moderate hiatal hernia.    
Acute  Frias Cath placed by Dr. Mitchell  600cc PVR  Follow urology recommendations.    
Yes

## 2024-03-18 NOTE — PATIENT INSTRUCTIONS
F/u with PCP for better diabetic control  UA had 1000 glucose today      Will call in 48-72 hours with urine culture results.

## 2024-03-20 NOTE — PROGRESS NOTES
Would like to see his rbus and bmp (doing tomorrow)  Last time I dilated his stricture and recommended tommy [employer] staff- Can you call lab and see if they can add sensitivities to his culture  Will try to treat prior to his cysto bc will likely have stricture and need to dilate

## 2024-03-21 ENCOUNTER — HOSPITAL ENCOUNTER (OUTPATIENT)
Dept: RADIOLOGY | Facility: HOSPITAL | Age: 74
Discharge: HOME | End: 2024-03-21
Attending: NURSE PRACTITIONER
Payer: MEDICARE

## 2024-03-21 ENCOUNTER — OFFICE VISIT (OUTPATIENT)
Dept: UROLOGY | Facility: CLINIC | Age: 74
End: 2024-03-21
Payer: MEDICARE

## 2024-03-21 DIAGNOSIS — N35.812 OTHER STRICTURE OF BULBOUS URETHRA IN MALE: ICD-10-CM

## 2024-03-21 DIAGNOSIS — R30.0 DYSURIA: ICD-10-CM

## 2024-03-21 DIAGNOSIS — N13.30 HYDRONEPHROSIS, BILATERAL: ICD-10-CM

## 2024-03-21 DIAGNOSIS — R31.0 GROSS HEMATURIA: ICD-10-CM

## 2024-03-21 DIAGNOSIS — B37.49 YEAST UTI: ICD-10-CM

## 2024-03-21 DIAGNOSIS — R35.0 URINARY FREQUENCY: ICD-10-CM

## 2024-03-21 DIAGNOSIS — R30.0 DYSURIA: Primary | ICD-10-CM

## 2024-03-21 DIAGNOSIS — E08.21 DIABETES MELLITUS DUE TO UNDERLYING CONDITION WITH DIABETIC NEPHROPATHY, UNSPECIFIED WHETHER LONG TERM INSULIN USE: ICD-10-CM

## 2024-03-21 PROCEDURE — 76770 US EXAM ABDO BACK WALL COMP: CPT | Mod: TC

## 2024-03-21 PROCEDURE — 76770 US EXAM ABDO BACK WALL COMP: CPT | Mod: 26,,, | Performed by: RADIOLOGY

## 2024-03-21 PROCEDURE — 99443 PR PHYSICIAN TELEPHONE EVALUATION 21-30 MIN: CPT | Mod: 95,,, | Performed by: UROLOGY

## 2024-03-21 RX ORDER — FLUCONAZOLE 200 MG/1
200 TABLET ORAL DAILY
Qty: 10 TABLET | Refills: 0 | Status: SHIPPED | OUTPATIENT
Start: 2024-03-21 | End: 2024-03-31

## 2024-03-21 RX ORDER — LIDOCAINE HYDROCHLORIDE 20 MG/ML
JELLY TOPICAL ONCE
Status: CANCELLED | OUTPATIENT
Start: 2024-03-21 | End: 2024-03-21

## 2024-03-21 RX ORDER — LIDOCAINE HYDROCHLORIDE 10 MG/ML
10 INJECTION INFILTRATION; PERINEURAL ONCE
Status: CANCELLED | OUTPATIENT
Start: 2024-03-21 | End: 2024-03-21

## 2024-03-21 NOTE — PATIENT INSTRUCTIONS
IMPRESSION:  Carson Griffith is a 73 y.o. male    Encounter Diagnoses   Name Primary?    Dysuria Yes    Hydronephrosis, bilateral     Hematuria     Yeast UTI        PLAN:  E consult to Infectious Disease for recommendations for yeast urinary tract infection.  He is on Celexa and can not take that we can with it without risk of affecting his QTC interval.  Planning to take him for cystoscopy, urethral dilation and Frias placement on Monday as well as look in the bladder to ensure no bladder tumors.  If unable to do this on Monday then will at least evaluate him with a cystoscopy Monday and schedule him for Wednesday outpatient surgery for bladder neck dilation/urethral stricture dilation and Frias placement  Frias to remain x2 weeks  While he has a Frias and he will get an ultrasound in the kidney function test to see if his kidney function improves and if his hydronephrosis improves  If his hydronephrosis improves then he will need to learn CIC, clean intermittent catheterization to keep his bladder empty every few hours.  Stressed the importance of this today explained that he will eventually go into worsening renal failure like he is already doing so now.  If his hydronephrosis does not improve with the Frias catheter in then he needs bilateral ureteral stents because his bladder wall is too thick to allow drainage of urine from kidneys.  We would have to schedule him to have this done asleep and we would the catheter again for 2 weeks with stents in and repeat another ultrasound and blood test while he has had the stents and the catheter for 2 weeks.  If his kidney function in hydro improves with the stents and catheter then I recommend exchanging stents every 6 months to bypass thick walled bladder and chronic Frias drainage whether that is indwelling or clean intermittent catheterization.  Also explained that if he has a stricture or scar tissue the clean intermittent catheterization is only way to kind of keep  this open for him

## 2024-03-21 NOTE — Clinical Note
Let him know I sent in Diflucan to start now.  200 mg once a day for 10 days  He has having a procedure on Monday.  If he shows up for that procedure had schedule him for an ultrasound of the MP and A1c about 7-10 days after his procedure Monday.  He can do a telephone visit with me after those images when I return Altagracia needs to stay in until I return
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
## 2024-03-22 ENCOUNTER — E-CONSULT (OUTPATIENT)
Dept: INFECTIOUS DISEASES | Facility: HOSPITAL | Age: 74
End: 2024-03-22
Payer: MEDICARE

## 2024-03-22 ENCOUNTER — TELEPHONE (OUTPATIENT)
Dept: UROLOGY | Facility: CLINIC | Age: 74
End: 2024-03-22
Payer: MEDICARE

## 2024-03-22 DIAGNOSIS — N39.0 COMPLICATED UTI (URINARY TRACT INFECTION): Primary | ICD-10-CM

## 2024-03-22 PROCEDURE — 99451 NTRPROF PH1/NTRNET/EHR 5/>: CPT | Mod: ,,, | Performed by: INTERNAL MEDICINE

## 2024-03-22 NOTE — CONSULTS
Infectious Diseases  Response for E-Consult     Patient Name: Carson Griffith  MRN: [id number]  Primary Care Provider: Cirilo Thomas MD   Requesting Provider: Karen Mitchell*  Consults    Recommendation:   Candiduria /complicated UTI  The other medications are not as effective for the urine .  Might be best to use a different agent than Celexa .  IV Micafungin will not achieve enough levels in the urine -Mor we stop the Celexa for sometime .       Contingency:    Total time of Consultation: 10 minute    I did not speak to the requesting provider verbally about this.     *This eConsult is based on the clinical data available to me and is furnished without benefit of a physical examination. The eConsult will need to be interpreted in light of any clinical issues or changes in patient status not available to me at the time of filing this eConsults. Significant changes in patient condition or level of acuity should result in immediate formal consultation and reevaluation. Please alert me if you have further questions.    Thank you for this eConsult referral.     Rasheed Grossman MD, [facility]  Infectious Diseases

## 2024-03-22 NOTE — TELEPHONE ENCOUNTER
----- Message from Karen Mitchell MD sent at 3/21/2024  5:25 PM CDT -----  request S diltators up to 20 Thai from OR. (Blue an din package)  super stiff wrie from or.   glidewire from or  5 Thai open ended from or.   18 Thai and 16 Thai [place] tip catheter from or       Call [facility] and request above for this pt Monday - needs to be last pt at [facility].

## 2024-03-22 NOTE — TELEPHONE ENCOUNTER
----- Message from Karen Mitchell MD sent at 3/21/2024  5:40 PM CDT -----  Let him know I sent in Diflucan to start now.  200 mg once a day for 10 days  He has having a procedure on Monday.  If he shows up for that procedure had schedule him for an ultrasound of the MP and A1c about 7-10 days after his procedure Monday.  He can do a telephone visit with me after those images when I return Altagracia needs to stay in until I return

## 2024-03-25 ENCOUNTER — HOSPITAL ENCOUNTER (OUTPATIENT)
Facility: HOSPITAL | Age: 74
Discharge: HOME | End: 2024-03-25
Attending: UROLOGY | Admitting: UROLOGY
Payer: MEDICARE

## 2024-03-25 DIAGNOSIS — R30.0 DYSURIA: ICD-10-CM

## 2024-03-25 DIAGNOSIS — N13.30 HYDRONEPHROSIS, BILATERAL: ICD-10-CM

## 2024-03-25 DIAGNOSIS — R31.9 HEMATURIA: ICD-10-CM

## 2024-03-25 LAB
BACTERIA #/AREA URNS HPF: ABNORMAL /HPF
BILIRUB UR QL STRIP: NEGATIVE
BILIRUBIN, UA POC OHS: NEGATIVE
BLOOD, UA POC OHS: ABNORMAL
CLARITY UR: ABNORMAL
CLARITY, UA POC OHS: ABNORMAL
COLOR UR: ABNORMAL
COLOR, UA POC OHS: ABNORMAL
GLUCOSE UR QL STRIP: ABNORMAL
GLUCOSE, UA POC OHS: 500
HGB UR QL STRIP: ABNORMAL
HYALINE CASTS #/AREA URNS LPF: 0 /LPF
KETONES UR QL STRIP: NEGATIVE
KETONES, UA POC OHS: NEGATIVE
LEUKOCYTE ESTERASE UR QL STRIP: ABNORMAL
LEUKOCYTES, UA POC OHS: ABNORMAL
MICROSCOPIC COMMENT: ABNORMAL
NITRITE UR QL STRIP: NEGATIVE
NITRITE, UA POC OHS: NEGATIVE
PH UR STRIP: 7 [PH] (ref 5–8)
PH, UA POC OHS: 7
PROT UR QL STRIP: ABNORMAL
PROTEIN, UA POC OHS: 30
RBC #/AREA URNS HPF: >100 /HPF (ref 0–4)
SP GR UR STRIP: 1.01 (ref 1–1.03)
SPECIFIC GRAVITY, UA POC OHS: 1.02
URN SPEC COLLECT METH UR: ABNORMAL
UROBILINOGEN UR STRIP-ACNC: NEGATIVE EU/DL
UROBILINOGEN, UA POC OHS: 0.2
WBC #/AREA URNS HPF: >100 /HPF (ref 0–5)
WBC CLUMPS URNS QL MICRO: ABNORMAL
YEAST URNS QL MICRO: ABNORMAL

## 2024-03-25 PROCEDURE — 25000003 PHARM REV CODE 250: Performed by: UROLOGY

## 2024-03-25 PROCEDURE — 52000 CYSTOURETHROSCOPY: CPT | Performed by: UROLOGY

## 2024-03-25 PROCEDURE — 87102 FUNGUS ISOLATION CULTURE: CPT | Performed by: UROLOGY

## 2024-03-25 PROCEDURE — 81000 URINALYSIS NONAUTO W/SCOPE: CPT | Performed by: UROLOGY

## 2024-03-25 PROCEDURE — 87205 SMEAR GRAM STAIN: CPT | Performed by: UROLOGY

## 2024-03-25 PROCEDURE — 87086 URINE CULTURE/COLONY COUNT: CPT | Performed by: UROLOGY

## 2024-03-25 PROCEDURE — 52000 CYSTOURETHROSCOPY: CPT | Mod: ,,, | Performed by: UROLOGY

## 2024-03-25 RX ORDER — CLOTRIMAZOLE AND BETAMETHASONE DIPROPIONATE 10; .64 MG/G; MG/G
CREAM TOPICAL 2 TIMES DAILY
Qty: 45 G | Refills: 2 | Status: SHIPPED | OUTPATIENT
Start: 2024-03-25 | End: 2025-03-25

## 2024-03-25 RX ORDER — LIDOCAINE HYDROCHLORIDE 20 MG/ML
JELLY TOPICAL ONCE
Status: DISCONTINUED | OUTPATIENT
Start: 2024-03-25 | End: 2024-03-25 | Stop reason: HOSPADM

## 2024-03-25 RX ORDER — LIDOCAINE HYDROCHLORIDE 10 MG/ML
10 INJECTION INFILTRATION; PERINEURAL ONCE
Status: DISCONTINUED | OUTPATIENT
Start: 2024-03-25 | End: 2024-03-25 | Stop reason: HOSPADM

## 2024-03-25 RX ORDER — LIDOCAINE HYDROCHLORIDE 20 MG/ML
JELLY TOPICAL
Status: DISCONTINUED | OUTPATIENT
Start: 2024-03-25 | End: 2024-03-25 | Stop reason: HOSPADM
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
## 2024-03-25 NOTE — PLAN OF CARE
Tolerated procedure well.  Dhillon bag intact, dhillon leg bag given, and instructions given prior to departure. Discharge instructions in hand upon departure. Patient ambulated out of facility without difficulty w/o incident.

## 2024-03-25 NOTE — OP NOTE
[facility] Procedure Note- [facility]  Date: 03/25/2024    Procedures: Flexible cystourethroscopy and placement of dhillon    Pre Procedure Diagnosis:  1. Dysuria    2. Hydronephrosis, bilateral    3. Hematuria        Post Procedure Diagnosis: same, see below for findings    Surgeon: Karen Mitchell MD    Indications: Carson Griffith is a 73 y.o. male with above pre-procedure diagnosis. Urine from today reviewed. H&P reviewed.     Specimen: cloudy urine sent for gram stain, ua reflex and fungal culture    Anesthesia: 2% uro-jet lidocaine jelly for local analgesia    Procedure in detail:   Flexible cysto-urethroscopy was performed after consent was obtained.  The risks and benefits were explained.    2% lidocaine urojet was used for local analgesia.  The genitalia was prepped and draped in the sterile fashion with betadine.    The flexible scope was advanced into the urethra and into the bladder.  Wire was advanced into the bladder. Urine cloudy.   16 fr [place] tip advanced over wire into bladder. Went easily  10cc placed in balloon  300cc cloudy urine drained.       The patient tolerated the procedure well without complication.    Findings: (pictures were uploaded into media)    Wide mouth pendulous urethral stricture. Moderate bph. Balanoposthitis (diabetic)                Assesment: Carson Griffith is a 73 y.o. male with wide-mouth urethral stricture which I was able to pass the scope.  Placed a Dhillon catheter and he drained 300 cc.  We will repeat an ultrasound to make sure the kidneys are draining with a catheter in.  If they are not he may need ureteral since to help bypass the bladder wall which was sick so that his kidneys can drained.  Also will add a kidney function test to his ultrasound in 2 weeks.  We will determine what to do with the catheter and if he would stents based on the following ultrasound.  Also found to have yeast infection of his head of his penis called balanoposthitis likely due  to his diabetes will treat this as well  1. Dysuria    2. Hydronephrosis, bilateral    3. Hematuria        Plan:  Apply clotrimazole/betamethasone cream to the head of the penis twice a day for the next 3 weeks.  Dhillon to remain x2 weeks or more until I can review those images  While he has a Dhillon and he will get an ultrasound in the kidney function test to see if his kidney function improves and if his hydronephrosis improves  If his hydronephrosis improves then he will need to learn CIC, clean intermittent catheterization to keep his bladder empty every few hours.  Stressed the importance of this today explained that he will eventually go into worsening renal failure like he is already doing so now.  If his hydronephrosis does not improve with the Dhillon catheter in then he needs bilateral ureteral stents because his bladder wall is too thick to allow drainage of urine from kidneys.  We would have to schedule him to have this done asleep and we would the catheter again for 2 weeks with stents in and repeat another ultrasound and blood test while he has had the stents and the catheter for 2 weeks.  If his kidney function in hydro improves with the stents and catheter then I recommend exchanging stents every 6 months to bypass thick walled bladder and chronic Dhillon drainage whether that is indwelling or clean intermittent catheterization.  Also explained that if since he has a stricture or scar tissue the clean intermittent catheterization is only way to kind of keep this open for him  Finish diflucan  Fu ua reflex, gram stain and fungal culture  Follow up for telephone visit after rbus and bmp and A1c to check diabetes in 2 weeks with dhillon in. Will determine plan for dhillon after that.     Karen Mitchell MD

## 2024-03-25 NOTE — DISCHARGE INSTRUCTIONS
Your urologist is: Dr.Jennifer Mitchell  Office number: [phone]  Address: [address] [place] [address], [address], [facility] [address]      PLEASE READ the following directions and contact our office with any questions via phone or the portal. If you were told that you need an appointment and no appointment was made, call the office the day after surgery and ask to speak with 's nurse to make an appointment.    Wide mouth pendulous urethral stricture. Moderate bph. Balanoposthitis (diabetic)                Assesment: Carosn Griffith is a 73 y.o. male with wide-mouth urethral stricture which I was able to pass the scope.  Placed a Frias catheter and he drained 300 cc.  We will repeat an ultrasound to make sure the kidneys are draining with a catheter in.  If they are not he may need ureteral since to help bypass the bladder wall which was sick so that his kidneys can drained.  Also will add a kidney function test to his ultrasound in 2 weeks.  We will determine what to do with the catheter and if he would stents based on the following ultrasound.  Also found to have yeast infection of his head of his penis called balanoposthitis likely due to his diabetes will treat this as well  1. Dysuria    2. Hydronephrosis, bilateral    3. Hematuria        Plan:  Apply clotrimazole/betamethasone cream to the head of the penis twice a day for the next 3 weeks.  Frias to remain x2 weeks or more until I can review those images  While he has a Frias and he will get an ultrasound in the kidney function test to see if his kidney function improves and if his hydronephrosis improves  If his hydronephrosis improves then he will need to learn CIC, clean intermittent catheterization to keep his bladder empty every few hours.  Stressed the importance of this today explained that he will eventually go into worsening renal failure like he is already doing so now.  If his hydronephrosis does not improve with the Frias catheter in  then he needs bilateral ureteral stents because his bladder wall is too thick to allow drainage of urine from kidneys.  We would have to schedule him to have this done asleep and we would the catheter again for 2 weeks with stents in and repeat another ultrasound and blood test while he has had the stents and the catheter for 2 weeks.  If his kidney function in hydro improves with the stents and catheter then I recommend exchanging stents every 6 months to bypass thick walled bladder and chronic Dhillon drainage whether that is indwelling or clean intermittent catheterization.  Also explained that if since he has a stricture or scar tissue the clean intermittent catheterization is only way to kind of keep this open for him  Finish diflucan  Fu ua reflex, gram stain and fungal culture  Follow up for telephone visit after rbus and bmp and A1c to check diabetes in 2 weeks with dhillon in. Will determine plan for dhillon after that.     Post Cystoscopy Instructions    What to Expect After a Cystoscopy  For the next 24-48 hours, you may feel a mild burning when you urinate. This burning is normal and expected. Drink plenty of water to dilute the urine to help relieve the burning sensation. You may also see a small amount of blood in your urine after the procedure.    Unless you are already taking antibiotics, you may be given an antibiotic after the test to prevent infection.    Signs and Symptoms to Report  Call the [facility] at [phone] if you develop any of the following   Fever of 101 degrees or higher  Chills or persistent bleeding  Inability to urinate

## 2024-03-25 NOTE — DISCHARGE SUMMARY
"[facility] - Periop Services  Urology  Discharge Note - Short Stay      Patient Name: Carson Griffith  MRN: [id number]  Discharge Date and Time:  03/25/2024 5:08 PM  Attending Physician: Karen Mitchell.*   Discharging Provider: Karen Mitchell MD  Primary Care Physician: Cirilo Thomas MD    There are no hospital problems to display for this patient.      Final Diagnoses: Same as principal problem.    Hospital Course: Patient was admitted for an outpatient procedure and tolerated the procedure well with no complications.*    Procedure(s) (LRB):  CYSTOSCOPY (N/A)  DILATION, URETHRA (N/A)     Indwelling Lines/Drains at time of discharge:   Lines/Drains/Airways       Drain  Duration                  Urethral Catheter 03/25/24 Coude 16 Fr. <1 day                    Discharged Condition: good    Disposition: home    Follow Up:      Patient Instructions:   No discharge procedures on file.    Medications:  Reconciled Home Medications:      Medication List        START taking these medications      clotrimazole-betamethasone 1-0.05% cream  Commonly known as: LOTRISONE  Apply topically 2 (two) times daily.            CHANGE how you take these medications      OTEZLA 30 mg Tab  Generic drug: apremilast  Take 1 tablet by mouth twice a day  What changed:   how much to take  how to take this  when to take this            CONTINUE taking these medications      ALCOHOL PADS Padm  Generic drug: alcohol swabs  TEST 2 TIMES DAILY     atorvastatin 10 MG tablet  Commonly known as: LIPITOR  Take 10 mg by mouth once daily.     citalopram 20 MG tablet  Commonly known as: CeleXA  Take 20 mg by mouth once daily.     COMFORT EZ INSULIN SYRINGE 1 mL 31 gauge x 5/16 Syrg  Generic drug: insulin syringe-needle U-100  INJECT SUBCUTANEOUSLY 2 TIMES DAILY     * COMFORT EZ PEN NEEDLES 33 gauge x 5/32" Ndle  Generic drug: pen needle, diabetic  INJECT SUBCUTANEOUSLY 2 TIMES DAILY     * BD ULTRA-FINE SHANI PEN NEEDLE 32 " "gauge x 5/32" Ndle  Generic drug: pen needle, diabetic  Inject 1 each into the skin 3 (three) times daily.     FARXIGA 10 mg tablet  Generic drug: dapagliflozin propanediol  Take 10 mg by mouth.     fluconazole 200 MG Tab  Commonly known as: DIFLUCAN  Take 1 tablet (200 mg total) by mouth once daily. for 10 days     icosapent ethyL 1 gram Cap  Commonly known as: VASCEPA  Take 2 capsules by mouth 2 (two) times daily.     JARDIANCE 25 mg tablet  Generic drug: empagliflozin  Take 25 mg by mouth once daily.     LANTUS SOLOSTAR U-100 INSULIN glargine 100 units/mL SubQ pen  Generic drug: insulin  Inject 65 Units into the skin every evening.     levothyroxine 88 MCG tablet  Commonly known as: SYNTHROID  Take 88 mcg by mouth every evening.     LIDOcaine 5 %  Commonly known as: LIDODERM  Place 1 patch onto the skin once daily. Remove & Discard patch within 12 hours or as directed by MD     losartan 50 MG tablet  Commonly known as: COZAAR  Take 50 mg by mouth once daily.     multivitamin capsule  Take 1 capsule by mouth once daily.     TRULICITY 0.75 mg/0.5 mL pen injector  Generic drug: dulaglutide  Inject 0.75 mg into the skin every 7 days. Fridays     vit C-vit E-lutein-min-om-3 [id number] mg-unit-mg-mg Cap  Take 1 capsule by mouth once daily.           * This list has 2 medication(s) that are the same as other medications prescribed for you. Read the directions carefully, and ask your doctor or other care provider to review them with you.                  Discharge Procedure Orders (must include Diet, Follow-up, Activity):  No discharge procedures on file.     Urology [place] Procedure Note- [facility]  Date: 03/25/2024     Procedures: Flexible cystourethroscopy and placement of dhillon     Pre Procedure Diagnosis:  1. Dysuria    2. Hydronephrosis, bilateral    3. Hematuria          Post Procedure Diagnosis: same, see below for findings     Surgeon: Karen Mitchell MD     Indications: Carson Griffith is a 73 y.o. " male with above pre-procedure diagnosis. Urine from today reviewed. H&P reviewed.      Specimen: cloudy urine sent for gram stain, ua reflex and fungal culture     Anesthesia: 2% uro-jet lidocaine jelly for local analgesia     Procedure in detail:   Flexible cysto-urethroscopy was performed after consent was obtained.  The risks and benefits were explained.    2% lidocaine urojet was used for local analgesia.  The genitalia was prepped and draped in the sterile fashion with betadine.    The flexible scope was advanced into the urethra and into the bladder.  Wire was advanced into the bladder. Urine cloudy.   16 fr [place] tip advanced over wire into bladder. Went easily  10cc placed in balloon  300cc cloudy urine drained.         The patient tolerated the procedure well without complication.     Findings: (pictures were uploaded into media)     Wide mouth pendulous urethral stricture. Moderate bph. Balanoposthitis (diabetic)                     Assesment: Carson Griffith is a 73 y.o. male with wide-mouth urethral stricture which I was able to pass the scope.  Placed a Frias catheter and he drained 300 cc.  We will repeat an ultrasound to make sure the kidneys are draining with a catheter in.  If they are not he may need ureteral since to help bypass the bladder wall which was sick so that his kidneys can drained.  Also will add a kidney function test to his ultrasound in 2 weeks.  We will determine what to do with the catheter and if he would stents based on the following ultrasound.  Also found to have yeast infection of his head of his penis called balanoposthitis likely due to his diabetes will treat this as well  1. Dysuria    2. Hydronephrosis, bilateral    3. Hematuria          Plan:  Apply clotrimazole/betamethasone cream to the head of the penis twice a day for the next 3 weeks.  Frias to remain x2 weeks or more until I can review those images  While he has a Frias and he will get an ultrasound in the kidney  function test to see if his kidney function improves and if his hydronephrosis improves  If his hydronephrosis improves then he will need to learn CIC, clean intermittent catheterization to keep his bladder empty every few hours.  Stressed the importance of this today explained that he will eventually go into worsening renal failure like he is already doing so now.  If his hydronephrosis does not improve with the Dhillon catheter in then he needs bilateral ureteral stents because his bladder wall is too thick to allow drainage of urine from kidneys.  We would have to schedule him to have this done asleep and we would the catheter again for 2 weeks with stents in and repeat another ultrasound and blood test while he has had the stents and the catheter for 2 weeks.  If his kidney function in hydro improves with the stents and catheter then I recommend exchanging stents every 6 months to bypass thick walled bladder and chronic Dhillon drainage whether that is indwelling or clean intermittent catheterization.  Also explained that if since he has a stricture or scar tissue the clean intermittent catheterization is only way to kind of keep this open for him  Finish diflucan  Fu ua reflex, gram stain and fungal culture  Follow up for telephone visit after rbus and bmp and A1c to check diabetes in 2 weeks with dhillon in. Will determine plan for dhillon after that.          Karen Mitchell MD  Urology  [facility] - Periop Services

## 2024-03-26 ENCOUNTER — TELEPHONE (OUTPATIENT)
Dept: UROLOGY | Facility: CLINIC | Age: 74
End: 2024-03-26
Payer: MEDICARE

## 2024-03-26 VITALS
OXYGEN SATURATION: 97 % | HEART RATE: 83 BPM | RESPIRATION RATE: 18 BRPM | BODY MASS INDEX: 30.81 KG/M2 | HEIGHT: 65 IN | DIASTOLIC BLOOD PRESSURE: 87 MMHG | SYSTOLIC BLOOD PRESSURE: 185 MMHG | WEIGHT: 184.94 LBS | TEMPERATURE: 98 F

## 2024-03-26 LAB
GRAM STN SPEC: NORMAL
GRAM STN SPEC: NORMAL

## 2024-03-26 NOTE — TELEPHONE ENCOUNTER
----- Message from Karen Mitchell MD sent at 3/25/2024  4:49 PM CDT -----  arti martinez for an ultrasodund in about 2 weeks and a telphone visit with me at the end of the day. can lida book the tele visit

## 2024-03-28 ENCOUNTER — TELEPHONE (OUTPATIENT)
Dept: UROLOGY | Facility: CLINIC | Age: 74
End: 2024-03-28
Payer: MEDICARE

## 2024-03-28 LAB
BACTERIA UR CULT: ABNORMAL
BACTERIA UR CULT: NO GROWTH

## 2024-03-28 NOTE — TELEPHONE ENCOUNTER
Pt called with c/o leakage around catheter/passing some small clots since recent cath placement   Encouraged hydration   Informed spasm and normal   Pt vu     Per meli  This is a [facility] patient that had a dilation on Monday. His catheter is leaking and I tried messaging Paula but it says she is out of the office

## 2024-04-08 ENCOUNTER — HOSPITAL ENCOUNTER (OUTPATIENT)
Dept: RADIOLOGY | Facility: HOSPITAL | Age: 74
Discharge: HOME | End: 2024-04-08
Attending: UROLOGY
Payer: MEDICARE

## 2024-04-08 DIAGNOSIS — N13.30 HYDRONEPHROSIS, BILATERAL: ICD-10-CM

## 2024-04-08 DIAGNOSIS — B37.49 YEAST UTI: ICD-10-CM

## 2024-04-08 PROCEDURE — 76770 US EXAM ABDO BACK WALL COMP: CPT | Mod: 26,,, | Performed by: RADIOLOGY

## 2024-04-08 PROCEDURE — 76770 US EXAM ABDO BACK WALL COMP: CPT | Mod: TC

## 2024-04-16 ENCOUNTER — OFFICE VISIT (OUTPATIENT)
Dept: UROLOGY | Facility: CLINIC | Age: 74
End: 2024-04-16
Payer: MEDICARE

## 2024-04-16 DIAGNOSIS — N28.9 RENAL INSUFFICIENCY: ICD-10-CM

## 2024-04-16 DIAGNOSIS — N13.30 HYDRONEPHROSIS, UNSPECIFIED HYDRONEPHROSIS TYPE: Primary | ICD-10-CM

## 2024-04-16 PROCEDURE — 99443 PR PHYSICIAN TELEPHONE EVALUATION 21-30 MIN: CPT | Mod: 95,,, | Performed by: UROLOGY

## 2024-04-16 NOTE — Clinical Note
· Schedule dhillon exchange for 4/22/24 · Renal scan soon (if it can be done before 4/22 then change the 4/22 appt to learn intermittent catheterization instead) · Schedule nurse visit to learn CIC after renal scan · Telephone visit after renal scan to decide on stent placement or not. (Double book this) or put for 4pm one day
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
## 2024-04-16 NOTE — PATIENT INSTRUCTIONS
Encounter Diagnoses   Name Primary?    Hydronephrosis, unspecified hydronephrosis type Yes    Renal insufficiency        Left hydro and kidney function improved with dhillon in  However still has right hydronephrosis  Need to evaluate if right kidney is obstructed with renal scan. If so will need stent and eventual definitive treatment.   Will need to learn CIC since hydro improved with dhillon   But will wait until after renal scan    PLAN:  Schedule dhillon exchange for 4/22/24  Renal scan soon (if it can be done before 4/22 then change the 4/22 appt to learn intermittent catheterization instead)  Schedule nurse visit to learn CIC after renal scan  Telephone visit after renal scan to decide on stent placement or not.   Better diabetes control

## 2024-04-18 ENCOUNTER — HOSPITAL ENCOUNTER (OUTPATIENT)
Dept: RADIOLOGY | Facility: HOSPITAL | Age: 74
Discharge: HOME | End: 2024-04-18
Attending: UROLOGY
Payer: MEDICARE

## 2024-04-18 VITALS — WEIGHT: 190 LBS | BODY MASS INDEX: 31.62 KG/M2

## 2024-04-18 DIAGNOSIS — N13.30 HYDRONEPHROSIS, UNSPECIFIED HYDRONEPHROSIS TYPE: ICD-10-CM

## 2024-04-18 PROCEDURE — A9562 TC99M MERTIATIDE: HCPCS | Performed by: UROLOGY

## 2024-04-18 PROCEDURE — 78708 K FLOW/FUNCT IMAGE W/DRUG: CPT | Mod: TC

## 2024-04-18 PROCEDURE — 63600175 PHARM REV CODE 636 W HCPCS: Performed by: UROLOGY

## 2024-04-18 RX ORDER — FUROSEMIDE 10 MG/ML
20 INJECTION INTRAMUSCULAR; INTRAVENOUS ONCE
Status: COMPLETED | OUTPATIENT
Start: 2024-04-18 | End: 2024-04-18

## 2024-04-18 RX ORDER — BETIATIDE 1 MG/1
4.7 INJECTION, POWDER, LYOPHILIZED, FOR SOLUTION INTRAVENOUS
Status: COMPLETED | OUTPATIENT
Start: 2024-04-18 | End: 2024-04-18

## 2024-04-18 RX ADMIN — FUROSEMIDE 20 MG: 10 INJECTION, SOLUTION INTRAMUSCULAR; INTRAVENOUS at 02:04

## 2024-04-18 RX ADMIN — TECHNESCAN TC 99M MERTIATIDE 4.7 MILLICURIE: 1 INJECTION, POWDER, LYOPHILIZED, FOR SOLUTION INTRAVENOUS at 02:04

## 2024-04-22 ENCOUNTER — CLINICAL SUPPORT (OUTPATIENT)
Dept: UROLOGY | Facility: CLINIC | Age: 74
End: 2024-04-22
Payer: MEDICARE

## 2024-04-22 DIAGNOSIS — R33.9 URINARY RETENTION: Primary | ICD-10-CM

## 2024-04-22 LAB — FUNGUS SPEC CULT: NORMAL

## 2024-04-22 PROCEDURE — 51701 INSERT BLADDER CATHETER: CPT | Mod: S$PBB,,, | Performed by: UROLOGY

## 2024-04-22 PROCEDURE — 99499 UNLISTED E&M SERVICE: CPT | Mod: S$PBB,,, | Performed by: UROLOGY

## 2024-04-22 PROCEDURE — 51701 INSERT BLADDER CATHETER: CPT | Mod: PBBFAC,PO

## 2024-04-22 NOTE — PROGRESS NOTES
Per Dr. Mitchell patient arrived in office today for catheter removal.  10ml of sterile water removed from catheter.  16F dhillon catheter removed with no difficulty.  Catheter bag draining yellow urine.  STAT lock removed from left leg.    Pt here to learn CIC    Taught by: Maki Hickman    Reason:urinary retention  Catheter used: 16 fr coude,   Catheter went: easily  Drained amount: 10cc  Urine sent for: no urine sent  Follow up date: 4/29/2024  prescription sent yet: Yes -   Box of catheters provided: 1    Patient was able to demonstrate CIC themselves       Discharge instructions for CIC provided: yes     Patient left office in satisfactory condition.        Neurogenic bladder/intermittent catheterizationnew: The patient has evidence of chronic urinary retention requiring catheterization.  This appears to be non-obstructive.     The patient has evidence of chronic urinary retention requiring catheterization.  The need for catheterization was assessed and the patient is likely to benefit from a regimen of clean intermittent catheterization.   The patient has not previously performed intermittent catheterization in the past and is not comfortable performing this alone yet  The patientdneed an appointment to learn CIC.   The etiology for urinary retention in this patient is thought to be uncertain etiology.  The anticipated duration of need is estimated to be indefinite.   The patient will not require insertion supplies.   The patient  will require a coude-tip catheter. Unable to pass regular tip easily.   The patient will need to catheterize 6x daily and will require a total of  180 catheters per month.

## 2024-04-29 ENCOUNTER — OFFICE VISIT (OUTPATIENT)
Dept: UROLOGY | Facility: CLINIC | Age: 74
End: 2024-04-29
Payer: MEDICARE

## 2024-04-29 DIAGNOSIS — N31.9 NEUROGENIC BLADDER: Primary | ICD-10-CM

## 2024-04-29 DIAGNOSIS — N13.30 HYDRONEPHROSIS, UNSPECIFIED HYDRONEPHROSIS TYPE: ICD-10-CM

## 2024-04-29 PROCEDURE — 99443 PR PHYSICIAN TELEPHONE EVALUATION 21-30 MIN: CPT | Mod: 95,,, | Performed by: UROLOGY
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
## 2024-05-29 ENCOUNTER — HOSPITAL ENCOUNTER (EMERGENCY)
Facility: HOSPITAL | Age: 74
Discharge: HOME | End: 2024-05-30
Attending: EMERGENCY MEDICINE
Payer: MEDICARE

## 2024-05-29 DIAGNOSIS — S39.012A LUMBAR STRAIN, INITIAL ENCOUNTER: ICD-10-CM
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
## 2024-05-29 DIAGNOSIS — S32.020A COMPRESSION FRACTURE OF L2 LUMBAR VERTEBRA, CLOSED, INITIAL ENCOUNTER: ICD-10-CM

## 2024-05-29 DIAGNOSIS — S32.040A COMPRESSION FRACTURE OF L4 LUMBAR VERTEBRA, CLOSED, INITIAL ENCOUNTER: ICD-10-CM

## 2024-05-29 DIAGNOSIS — S29.019A THORACIC MYOFASCIAL STRAIN, INITIAL ENCOUNTER: ICD-10-CM

## 2024-05-29 PROCEDURE — 99283 EMERGENCY DEPT VISIT LOW MDM: CPT | Mod: 25

## 2024-05-29 PROCEDURE — 99284 EMERGENCY DEPT VISIT MOD MDM: CPT | Mod: 25

## 2024-05-30 VITALS
OXYGEN SATURATION: 96 % | BODY MASS INDEX: 32.28 KG/M2 | TEMPERATURE: 98 F | SYSTOLIC BLOOD PRESSURE: 127 MMHG | WEIGHT: 194 LBS | HEART RATE: 95 BPM | RESPIRATION RATE: 18 BRPM | DIASTOLIC BLOOD PRESSURE: 74 MMHG

## 2024-05-30 LAB — POCT GLUCOSE: 199 MG/DL (ref 70–110)

## 2024-05-30 PROCEDURE — 82962 GLUCOSE BLOOD TEST: CPT
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
## (undated) DEVICE — Device

## (undated) DEVICE — GLOVE SENSICARE PI ALOE 6

## (undated) DEVICE — DRAPE MEDIUM SHEET 40X70IN

## (undated) DEVICE — SET CYSTO IRR DRP CHMBR 84IN